# Patient Record
Sex: FEMALE | Race: WHITE | NOT HISPANIC OR LATINO | Employment: OTHER | URBAN - METROPOLITAN AREA
[De-identification: names, ages, dates, MRNs, and addresses within clinical notes are randomized per-mention and may not be internally consistent; named-entity substitution may affect disease eponyms.]

---

## 2017-01-19 RX ORDER — ATORVASTATIN CALCIUM 20 MG/1
TABLET, FILM COATED ORAL
Qty: 45 TABLET | Refills: 0 | Status: SHIPPED | OUTPATIENT
Start: 2017-01-19 | End: 2017-04-18 | Stop reason: SDUPTHER

## 2017-02-28 RX ORDER — MECLIZINE HYDROCHLORIDE 25 MG/1
TABLET ORAL
Qty: 40 TABLET | Refills: 0 | Status: SHIPPED | OUTPATIENT
Start: 2017-02-28 | End: 2017-11-14

## 2017-03-07 RX ORDER — RALOXIFENE HYDROCHLORIDE 60 MG/1
TABLET, FILM COATED ORAL
Qty: 90 TABLET | Refills: 1 | Status: SHIPPED | OUTPATIENT
Start: 2017-03-07 | End: 2017-08-26 | Stop reason: SDUPTHER

## 2017-04-18 RX ORDER — ATORVASTATIN CALCIUM 20 MG/1
TABLET, FILM COATED ORAL
Qty: 45 TABLET | Refills: 0 | Status: SHIPPED | OUTPATIENT
Start: 2017-04-18 | End: 2017-07-16 | Stop reason: SDUPTHER

## 2017-04-27 ENCOUNTER — TELEPHONE (OUTPATIENT)
Dept: INTERNAL MEDICINE | Facility: CLINIC | Age: 72
End: 2017-04-27

## 2017-04-27 RX ORDER — AMOXICILLIN AND CLAVULANATE POTASSIUM 875; 125 MG/1; MG/1
1 TABLET, FILM COATED ORAL 2 TIMES DAILY
Qty: 20 TABLET | Refills: 0 | Status: SHIPPED | OUTPATIENT
Start: 2017-04-27 | End: 2017-05-08

## 2017-04-27 NOTE — TELEPHONE ENCOUNTER
Pt c/o sinus infection  Ears hurt, sinus pressure, gums hurt, eyes are crusty in the morning  She is requesting a RX

## 2017-05-08 ENCOUNTER — OFFICE VISIT (OUTPATIENT)
Dept: INTERNAL MEDICINE | Facility: CLINIC | Age: 72
End: 2017-05-08

## 2017-05-08 VITALS
HEART RATE: 100 BPM | SYSTOLIC BLOOD PRESSURE: 140 MMHG | OXYGEN SATURATION: 92 % | HEIGHT: 64 IN | RESPIRATION RATE: 16 BRPM | BODY MASS INDEX: 31.24 KG/M2 | DIASTOLIC BLOOD PRESSURE: 90 MMHG | WEIGHT: 183 LBS | TEMPERATURE: 98.7 F

## 2017-05-08 DIAGNOSIS — J01.01 ACUTE RECURRENT MAXILLARY SINUSITIS: ICD-10-CM

## 2017-05-08 DIAGNOSIS — J32.0 CHRONIC MAXILLARY SINUSITIS: Primary | ICD-10-CM

## 2017-05-08 PROCEDURE — 99213 OFFICE O/P EST LOW 20 MIN: CPT | Performed by: NURSE PRACTITIONER

## 2017-05-08 RX ORDER — FLUTICASONE PROPIONATE 50 MCG
2 SPRAY, SUSPENSION (ML) NASAL DAILY
Qty: 1 EACH | Refills: 3 | Status: SHIPPED | OUTPATIENT
Start: 2017-05-08 | End: 2017-05-08 | Stop reason: SDUPTHER

## 2017-05-08 RX ORDER — AMOXICILLIN AND CLAVULANATE POTASSIUM 875; 125 MG/1; MG/1
TABLET, FILM COATED ORAL
Qty: 20 TABLET | Refills: 0 | OUTPATIENT
Start: 2017-05-08

## 2017-05-08 RX ORDER — FLUTICASONE PROPIONATE 50 MCG
SPRAY, SUSPENSION (ML) NASAL
Qty: 48 ML | Refills: 3 | Status: SHIPPED | OUTPATIENT
Start: 2017-05-08 | End: 2017-11-14

## 2017-05-08 RX ORDER — AMOXICILLIN AND CLAVULANATE POTASSIUM 875; 125 MG/1; MG/1
1 TABLET, FILM COATED ORAL 2 TIMES DAILY
Qty: 8 TABLET | Refills: 0 | Status: SHIPPED | OUTPATIENT
Start: 2017-05-08 | End: 2017-05-19

## 2017-05-19 ENCOUNTER — OFFICE VISIT (OUTPATIENT)
Dept: INTERNAL MEDICINE | Facility: CLINIC | Age: 72
End: 2017-05-19

## 2017-05-19 VITALS
SYSTOLIC BLOOD PRESSURE: 128 MMHG | TEMPERATURE: 98.2 F | BODY MASS INDEX: 31.41 KG/M2 | HEART RATE: 88 BPM | RESPIRATION RATE: 16 BRPM | DIASTOLIC BLOOD PRESSURE: 82 MMHG | WEIGHT: 183 LBS

## 2017-05-19 DIAGNOSIS — I10 ESSENTIAL HYPERTENSION: ICD-10-CM

## 2017-05-19 DIAGNOSIS — E78.2 MIXED HYPERLIPIDEMIA: ICD-10-CM

## 2017-05-19 DIAGNOSIS — F41.8 MIXED ANXIETY DEPRESSIVE DISORDER: ICD-10-CM

## 2017-05-19 DIAGNOSIS — H65.01 RIGHT ACUTE SEROUS OTITIS MEDIA, RECURRENCE NOT SPECIFIED: Primary | ICD-10-CM

## 2017-05-19 LAB
ALBUMIN SERPL-MCNC: 4.3 G/DL (ref 3.5–5.2)
ALBUMIN/GLOB SERPL: 1.6 G/DL
ALP SERPL-CCNC: 48 U/L (ref 39–117)
ALT SERPL-CCNC: 25 U/L (ref 1–33)
AST SERPL-CCNC: 25 U/L (ref 1–32)
BASOPHILS # BLD AUTO: 0.04 10*3/MM3 (ref 0–0.2)
BASOPHILS NFR BLD AUTO: 0.5 % (ref 0–1.5)
BILIRUB SERPL-MCNC: 0.4 MG/DL (ref 0.1–1.2)
BUN SERPL-MCNC: 12 MG/DL (ref 8–23)
BUN/CREAT SERPL: 16 (ref 7–25)
CALCIUM SERPL-MCNC: 10.6 MG/DL (ref 8.6–10.5)
CHLORIDE SERPL-SCNC: 106 MMOL/L (ref 98–107)
CHOLEST SERPL-MCNC: 182 MG/DL (ref 0–200)
CHOLEST/HDLC SERPL: 2.94 {RATIO}
CO2 SERPL-SCNC: 29.1 MMOL/L (ref 22–29)
CREAT SERPL-MCNC: 0.75 MG/DL (ref 0.57–1)
EOSINOPHIL # BLD AUTO: 0.49 10*3/MM3 (ref 0–0.7)
EOSINOPHIL NFR BLD AUTO: 5.9 % (ref 0.3–6.2)
ERYTHROCYTE [DISTWIDTH] IN BLOOD BY AUTOMATED COUNT: 12.7 % (ref 11.7–13)
GLOBULIN SER CALC-MCNC: 2.7 GM/DL
GLUCOSE SERPL-MCNC: 104 MG/DL (ref 65–99)
HCT VFR BLD AUTO: 42.8 % (ref 35.6–45.5)
HDLC SERPL-MCNC: 62 MG/DL (ref 40–60)
HGB BLD-MCNC: 14.1 G/DL (ref 11.9–15.5)
IMM GRANULOCYTES # BLD: 0.02 10*3/MM3 (ref 0–0.03)
IMM GRANULOCYTES NFR BLD: 0.2 % (ref 0–0.5)
LDLC SERPL CALC-MCNC: 83 MG/DL (ref 0–100)
LYMPHOCYTES # BLD AUTO: 2.5 10*3/MM3 (ref 0.9–4.8)
LYMPHOCYTES NFR BLD AUTO: 30.2 % (ref 19.6–45.3)
MCH RBC QN AUTO: 31.2 PG (ref 26.9–32)
MCHC RBC AUTO-ENTMCNC: 32.9 G/DL (ref 32.4–36.3)
MCV RBC AUTO: 94.7 FL (ref 80.5–98.2)
MONOCYTES # BLD AUTO: 0.8 10*3/MM3 (ref 0.2–1.2)
MONOCYTES NFR BLD AUTO: 9.7 % (ref 5–12)
NEUTROPHILS # BLD AUTO: 4.44 10*3/MM3 (ref 1.9–8.1)
NEUTROPHILS NFR BLD AUTO: 53.5 % (ref 42.7–76)
PLATELET # BLD AUTO: 180 10*3/MM3 (ref 140–500)
POTASSIUM SERPL-SCNC: 4.7 MMOL/L (ref 3.5–5.2)
PROT SERPL-MCNC: 7 G/DL (ref 6–8.5)
RBC # BLD AUTO: 4.52 10*6/MM3 (ref 3.9–5.2)
SODIUM SERPL-SCNC: 147 MMOL/L (ref 136–145)
TRIGL SERPL-MCNC: 185 MG/DL (ref 0–150)
VLDLC SERPL CALC-MCNC: 37 MG/DL (ref 5–40)
WBC # BLD AUTO: 8.29 10*3/MM3 (ref 4.5–10.7)

## 2017-05-19 PROCEDURE — 99214 OFFICE O/P EST MOD 30 MIN: CPT | Performed by: FAMILY MEDICINE

## 2017-05-19 PROCEDURE — G0439 PPPS, SUBSEQ VISIT: HCPCS | Performed by: FAMILY MEDICINE

## 2017-05-30 RX ORDER — VERAPAMIL HYDROCHLORIDE 240 MG/1
CAPSULE, EXTENDED RELEASE ORAL
Qty: 90 CAPSULE | Refills: 0 | Status: SHIPPED | OUTPATIENT
Start: 2017-05-30 | End: 2017-08-26 | Stop reason: SDUPTHER

## 2017-06-29 RX ORDER — LOSARTAN POTASSIUM AND HYDROCHLOROTHIAZIDE 12.5; 5 MG/1; MG/1
TABLET ORAL
Qty: 90 TABLET | Refills: 0 | Status: SHIPPED | OUTPATIENT
Start: 2017-06-29 | End: 2017-09-26 | Stop reason: SDUPTHER

## 2017-07-17 RX ORDER — ATORVASTATIN CALCIUM 20 MG/1
TABLET, FILM COATED ORAL
Qty: 45 TABLET | Refills: 0 | Status: SHIPPED | OUTPATIENT
Start: 2017-07-17 | End: 2017-10-12 | Stop reason: SDUPTHER

## 2017-08-28 RX ORDER — RALOXIFENE HYDROCHLORIDE 60 MG/1
TABLET, FILM COATED ORAL
Qty: 90 TABLET | Refills: 1 | Status: SHIPPED | OUTPATIENT
Start: 2017-08-28 | End: 2018-02-25 | Stop reason: SDUPTHER

## 2017-08-28 RX ORDER — VERAPAMIL HYDROCHLORIDE 240 MG/1
CAPSULE, EXTENDED RELEASE ORAL
Qty: 90 CAPSULE | Refills: 1 | Status: SHIPPED | OUTPATIENT
Start: 2017-08-28 | End: 2018-02-25 | Stop reason: SDUPTHER

## 2017-09-26 RX ORDER — LOSARTAN POTASSIUM AND HYDROCHLOROTHIAZIDE 12.5; 5 MG/1; MG/1
TABLET ORAL
Qty: 90 TABLET | Refills: 1 | Status: SHIPPED | OUTPATIENT
Start: 2017-09-26 | End: 2018-03-23 | Stop reason: SDUPTHER

## 2017-10-11 ENCOUNTER — TRANSCRIBE ORDERS (OUTPATIENT)
Dept: ADMINISTRATIVE | Facility: HOSPITAL | Age: 72
End: 2017-10-11

## 2017-10-11 DIAGNOSIS — Z12.31 SCREENING MAMMOGRAM, ENCOUNTER FOR: Primary | ICD-10-CM

## 2017-10-13 RX ORDER — ATORVASTATIN CALCIUM 20 MG/1
TABLET, FILM COATED ORAL
Qty: 45 TABLET | Refills: 0 | Status: SHIPPED | OUTPATIENT
Start: 2017-10-13 | End: 2018-01-10 | Stop reason: SDUPTHER

## 2017-11-14 ENCOUNTER — OFFICE VISIT (OUTPATIENT)
Dept: INTERNAL MEDICINE | Facility: CLINIC | Age: 72
End: 2017-11-14

## 2017-11-14 VITALS
HEART RATE: 98 BPM | TEMPERATURE: 98.3 F | BODY MASS INDEX: 31.24 KG/M2 | DIASTOLIC BLOOD PRESSURE: 86 MMHG | SYSTOLIC BLOOD PRESSURE: 146 MMHG | OXYGEN SATURATION: 96 % | WEIGHT: 182 LBS

## 2017-11-14 DIAGNOSIS — J30.1 CHRONIC ALLERGIC RHINITIS DUE TO POLLEN, UNSPECIFIED SEASONALITY: ICD-10-CM

## 2017-11-14 DIAGNOSIS — E78.2 MIXED HYPERLIPIDEMIA: ICD-10-CM

## 2017-11-14 DIAGNOSIS — E83.52 HYPERCALCEMIA: ICD-10-CM

## 2017-11-14 DIAGNOSIS — I10 ESSENTIAL HYPERTENSION: Primary | ICD-10-CM

## 2017-11-14 PROCEDURE — 99214 OFFICE O/P EST MOD 30 MIN: CPT | Performed by: FAMILY MEDICINE

## 2017-11-14 RX ORDER — MONTELUKAST SODIUM 10 MG/1
10 TABLET ORAL NIGHTLY
COMMUNITY
End: 2021-08-31

## 2017-11-14 RX ORDER — TRIAMCINOLONE ACETONIDE 55 UG/1
2 SPRAY, METERED NASAL DAILY
COMMUNITY
End: 2022-11-28

## 2017-11-14 RX ORDER — OXYMETAZOLINE HYDROCHLORIDE 0.05 G/100ML
2 SPRAY NASAL 2 TIMES DAILY
COMMUNITY
End: 2022-11-28

## 2017-11-14 NOTE — PROGRESS NOTES
Subjective   Gardenia Roper is a 72 y.o. female.     Chief Complaint   Patient presents with   • Hypertension   • Hyperlipidemia   • Allergies         History of Present Illness   Patient is here for recheck with a history of hypertension hyperlipidemia and seasonal allergies.  Her chronic sinus congestion much improved with allergy injections and treatment with Singulair from Dr. Cazares allergist.    Medications reviewed and continued for hypertension and she is recovered from previous depression that she was treated for this does not require antidepressants.  She is overall feeling much better.  There is a distant history of hyperkalemia.    The following portions of the patient's history were reviewed and updated as appropriate: allergies, current medications, past social history and problem list.    Review of Systems   Constitutional: Negative.    HENT: Negative.    Eyes: Negative.    Respiratory: Negative.    Cardiovascular: Negative.    Gastrointestinal: Negative.    Endocrine: Negative.    Genitourinary: Negative.    Musculoskeletal: Negative.    Skin: Negative.    Allergic/Immunologic: Negative.    Neurological: Negative.    Hematological: Negative.    Psychiatric/Behavioral: Negative.        Objective   Vitals:    11/14/17 1315   BP: 146/86   Pulse: 98   Temp: 98.3 °F (36.8 °C)   SpO2: 96%     Physical Exam   Constitutional: She is oriented to person, place, and time. She appears well-developed and well-nourished.   HENT:   Head: Normocephalic and atraumatic.   Right Ear: Tympanic membrane and external ear normal.   Left Ear: Tympanic membrane and external ear normal.   Nose: Nose normal.   Mouth/Throat: Oropharynx is clear and moist.   Eyes: Conjunctivae and EOM are normal. Pupils are equal, round, and reactive to light.   Neck: Normal range of motion. Neck supple. No JVD present. No thyromegaly present.   Cardiovascular: Normal rate, regular rhythm, normal heart sounds and intact distal pulses.     Pulmonary/Chest: Effort normal and breath sounds normal.   Abdominal: Soft. Bowel sounds are normal.   Musculoskeletal: Normal range of motion.   Lymphadenopathy:     She has no cervical adenopathy.   Neurological: She is alert and oriented to person, place, and time. No cranial nerve deficit. Coordination normal.   Skin: Skin is warm and dry. No rash noted.   Psychiatric: She has a normal mood and affect. Her behavior is normal. Judgment and thought content normal.   Vitals reviewed.      Assessment/Plan   Problem List Items Addressed This Visit        Cardiovascular and Mediastinum    Hypertension - Primary    Relevant Orders    CBC & Differential    Comprehensive Metabolic Panel    Lipid Panel With / Chol / HDL Ratio    Urinalysis With / Microscopic If Indicated - Urine, Clean Catch    TSH    Hyperlipidemia    Relevant Orders    CBC & Differential    Comprehensive Metabolic Panel    Lipid Panel With / Chol / HDL Ratio    Urinalysis With / Microscopic If Indicated - Urine, Clean Catch    TSH       Respiratory    Atopic rhinitis    Relevant Orders    CBC & Differential    Comprehensive Metabolic Panel    Lipid Panel With / Chol / HDL Ratio    Urinalysis With / Microscopic If Indicated - Urine, Clean Catch    TSH       Other    Hypercalcemia    Relevant Orders    CBC & Differential    Comprehensive Metabolic Panel    Lipid Panel With / Chol / HDL Ratio    Urinalysis With / Microscopic If Indicated - Urine, Clean Catch    TSH      Labs today continue current medications follow-up with allergist Medicare wellness visit in 6 months.

## 2017-11-15 LAB
ALBUMIN SERPL-MCNC: 4.6 G/DL (ref 3.5–5.2)
ALBUMIN/GLOB SERPL: 1.8 G/DL
ALP SERPL-CCNC: 50 U/L (ref 39–117)
ALT SERPL-CCNC: 25 U/L (ref 1–33)
APPEARANCE UR: CLEAR
AST SERPL-CCNC: 21 U/L (ref 1–32)
BACTERIA #/AREA URNS HPF: ABNORMAL /HPF
BASOPHILS # BLD AUTO: 0.05 10*3/MM3 (ref 0–0.2)
BASOPHILS NFR BLD AUTO: 0.5 % (ref 0–1.5)
BILIRUB SERPL-MCNC: 0.5 MG/DL (ref 0.1–1.2)
BILIRUB UR QL STRIP: NEGATIVE
BUN SERPL-MCNC: 11 MG/DL (ref 8–23)
BUN/CREAT SERPL: 12.9 (ref 7–25)
CALCIUM SERPL-MCNC: 11.6 MG/DL (ref 8.6–10.5)
CASTS URNS MICRO: ABNORMAL
CHLORIDE SERPL-SCNC: 102 MMOL/L (ref 98–107)
CHOLEST SERPL-MCNC: 200 MG/DL (ref 0–200)
CHOLEST/HDLC SERPL: 2.53 {RATIO}
CO2 SERPL-SCNC: 28.8 MMOL/L (ref 22–29)
COLOR UR: YELLOW
CREAT SERPL-MCNC: 0.85 MG/DL (ref 0.57–1)
EOSINOPHIL # BLD AUTO: 0.34 10*3/MM3 (ref 0–0.7)
EOSINOPHIL NFR BLD AUTO: 3.5 % (ref 0.3–6.2)
EPI CELLS #/AREA URNS HPF: ABNORMAL /HPF
ERYTHROCYTE [DISTWIDTH] IN BLOOD BY AUTOMATED COUNT: 12.8 % (ref 11.7–13)
GFR SERPLBLD CREATININE-BSD FMLA CKD-EPI: 65 ML/MIN/1.73
GFR SERPLBLD CREATININE-BSD FMLA CKD-EPI: 79 ML/MIN/1.73
GLOBULIN SER CALC-MCNC: 2.6 GM/DL
GLUCOSE SERPL-MCNC: 87 MG/DL (ref 65–99)
GLUCOSE UR QL: NEGATIVE
HCT VFR BLD AUTO: 47.1 % (ref 35.6–45.5)
HDLC SERPL-MCNC: 79 MG/DL (ref 40–60)
HGB BLD-MCNC: 14.9 G/DL (ref 11.9–15.5)
HGB UR QL STRIP: NEGATIVE
IMM GRANULOCYTES # BLD: 0.04 10*3/MM3 (ref 0–0.03)
IMM GRANULOCYTES NFR BLD: 0.4 % (ref 0–0.5)
KETONES UR QL STRIP: NEGATIVE
LDLC SERPL CALC-MCNC: 77 MG/DL (ref 0–100)
LEUKOCYTE ESTERASE UR QL STRIP: ABNORMAL
LYMPHOCYTES # BLD AUTO: 2.54 10*3/MM3 (ref 0.9–4.8)
LYMPHOCYTES NFR BLD AUTO: 25.9 % (ref 19.6–45.3)
MCH RBC QN AUTO: 31.2 PG (ref 26.9–32)
MCHC RBC AUTO-ENTMCNC: 31.6 G/DL (ref 32.4–36.3)
MCV RBC AUTO: 98.7 FL (ref 80.5–98.2)
MONOCYTES # BLD AUTO: 1.24 10*3/MM3 (ref 0.2–1.2)
MONOCYTES NFR BLD AUTO: 12.7 % (ref 5–12)
NEUTROPHILS # BLD AUTO: 5.58 10*3/MM3 (ref 1.9–8.1)
NEUTROPHILS NFR BLD AUTO: 57 % (ref 42.7–76)
NITRITE UR QL STRIP: NEGATIVE
PH UR STRIP: 7 [PH] (ref 5–8)
PLATELET # BLD AUTO: 199 10*3/MM3 (ref 140–500)
POTASSIUM SERPL-SCNC: 4.7 MMOL/L (ref 3.5–5.2)
PROT SERPL-MCNC: 7.2 G/DL (ref 6–8.5)
PROT UR QL STRIP: NEGATIVE
RBC # BLD AUTO: 4.77 10*6/MM3 (ref 3.9–5.2)
RBC #/AREA URNS HPF: ABNORMAL /HPF
SODIUM SERPL-SCNC: 143 MMOL/L (ref 136–145)
SP GR UR: 1.01 (ref 1–1.03)
TRIGL SERPL-MCNC: 219 MG/DL (ref 0–150)
TSH SERPL DL<=0.005 MIU/L-ACNC: 2.05 MIU/ML (ref 0.27–4.2)
UROBILINOGEN UR STRIP-MCNC: ABNORMAL MG/DL
VLDLC SERPL CALC-MCNC: 43.8 MG/DL (ref 5–40)
WBC # BLD AUTO: 9.79 10*3/MM3 (ref 4.5–10.7)
WBC #/AREA URNS HPF: ABNORMAL /HPF

## 2017-11-28 ENCOUNTER — HOSPITAL ENCOUNTER (OUTPATIENT)
Dept: MAMMOGRAPHY | Facility: HOSPITAL | Age: 72
Discharge: HOME | End: 2017-11-28
Admitting: FAMILY MEDICINE

## 2017-11-28 DIAGNOSIS — Z12.31 SCREENING MAMMOGRAM, ENCOUNTER FOR: ICD-10-CM

## 2017-11-28 PROCEDURE — 77063 BREAST TOMOSYNTHESIS BI: CPT

## 2017-11-28 PROCEDURE — G0202 SCR MAMMO BI INCL CAD: HCPCS

## 2018-01-10 RX ORDER — ATORVASTATIN CALCIUM 20 MG/1
TABLET, FILM COATED ORAL
Qty: 45 TABLET | Refills: 0 | Status: SHIPPED | OUTPATIENT
Start: 2018-01-10 | End: 2018-04-08 | Stop reason: SDUPTHER

## 2018-02-26 RX ORDER — VERAPAMIL HYDROCHLORIDE 240 MG/1
CAPSULE, EXTENDED RELEASE ORAL
Qty: 90 CAPSULE | Refills: 0 | Status: SHIPPED | OUTPATIENT
Start: 2018-02-26 | End: 2018-05-26 | Stop reason: SDUPTHER

## 2018-02-26 RX ORDER — RALOXIFENE HYDROCHLORIDE 60 MG/1
TABLET, FILM COATED ORAL
Qty: 90 TABLET | Refills: 0 | Status: SHIPPED | OUTPATIENT
Start: 2018-02-26 | End: 2018-05-27 | Stop reason: SDUPTHER

## 2018-03-23 RX ORDER — LOSARTAN POTASSIUM AND HYDROCHLOROTHIAZIDE 12.5; 5 MG/1; MG/1
TABLET ORAL
Qty: 90 TABLET | Refills: 0 | Status: SHIPPED | OUTPATIENT
Start: 2018-03-23 | End: 2018-06-21 | Stop reason: SDUPTHER

## 2018-04-09 RX ORDER — ATORVASTATIN CALCIUM 20 MG/1
TABLET, FILM COATED ORAL
Qty: 45 TABLET | Refills: 0 | Status: SHIPPED | OUTPATIENT
Start: 2018-04-09 | End: 2018-07-06 | Stop reason: SDUPTHER

## 2018-05-15 ENCOUNTER — OFFICE VISIT (OUTPATIENT)
Dept: INTERNAL MEDICINE | Facility: CLINIC | Age: 73
End: 2018-05-15

## 2018-05-15 VITALS
BODY MASS INDEX: 31.41 KG/M2 | SYSTOLIC BLOOD PRESSURE: 142 MMHG | OXYGEN SATURATION: 95 % | WEIGHT: 183 LBS | DIASTOLIC BLOOD PRESSURE: 84 MMHG | HEART RATE: 93 BPM | TEMPERATURE: 97.8 F

## 2018-05-15 DIAGNOSIS — Z00.00 MEDICARE ANNUAL WELLNESS VISIT, SUBSEQUENT: ICD-10-CM

## 2018-05-15 DIAGNOSIS — I10 ESSENTIAL HYPERTENSION: Primary | ICD-10-CM

## 2018-05-15 DIAGNOSIS — E78.2 MIXED HYPERLIPIDEMIA: ICD-10-CM

## 2018-05-15 DIAGNOSIS — F41.8 MIXED ANXIETY DEPRESSIVE DISORDER: ICD-10-CM

## 2018-05-15 PROCEDURE — G0439 PPPS, SUBSEQ VISIT: HCPCS | Performed by: FAMILY MEDICINE

## 2018-05-15 PROCEDURE — 99214 OFFICE O/P EST MOD 30 MIN: CPT | Performed by: FAMILY MEDICINE

## 2018-05-15 PROCEDURE — 96160 PT-FOCUSED HLTH RISK ASSMT: CPT | Performed by: FAMILY MEDICINE

## 2018-05-15 NOTE — PATIENT INSTRUCTIONS
Medicare Wellness  Personal Prevention Plan of Service     Date of Office Visit:  05/15/2018  Encounter Provider:  Jim Hampton Jr., MD  Place of Service:  [facility] INTERNAL MEDICINE  Patient Name: Gardenia Roper  :  1945    As part of the Medicare Wellness portion of your visit today, we are providing you with this personalized preventive plan of services (PPPS). This plan is based upon recommendations of the [place] Preventive Services Task Force (USPSTF) and the Advisory Committee on Immunization Practices (ACIP).    This lists the preventive care services that should be considered, and provides dates of when you are due. Items listed as completed are up-to-date and do not require any further intervention.    Health Maintenance   Topic Date Due   • TDAP/TD VACCINES (1 - Tdap) 1962   • ZOSTER VACCINE  2016   • COLONOSCOPY  2016   • DXA SCAN  2016   • MEDICARE ANNUAL WELLNESS  2018   • INFLUENZA VACCINE  2018   • LIPID PANEL  2018   • MAMMOGRAM  2019   • PNEUMOCOCCAL VACCINES (65+ LOW/MEDIUM RISK)  Completed       No orders of the defined types were placed in this encounter.      No Follow-up on file.

## 2018-05-15 NOTE — PROGRESS NOTES
Subjective   Gardenia Roper is a 72 y.o. female.     Chief Complaint   Patient presents with   • Annual Exam         History of Present Illness   Delightful lady here for Medicare wellness visit also rechecking hypertension hyperlipidemia history of Anxiety disorder.  She's had a history of severe allergic rhinitis which seems to be doing better this year.  She still has some seasonal allergies.  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  She never had a stroke.  She's had no cervical bleed.  Medicare wellness visit is performed.      The following portions of the patient's history were reviewed and updated as appropriate: allergies, current medications, past social history and problem list.    Review of Systems   Constitutional: Negative.    HENT: Negative.    Eyes: Negative.    Respiratory: Negative.    Cardiovascular: Negative.    Endocrine: Negative.    Genitourinary: Negative.    Musculoskeletal: Negative.    Allergic/Immunologic: Positive for environmental allergies.   All other systems reviewed and are negative.      Objective   Vitals:    05/15/18 1353   BP: 142/84   Pulse: 93   Temp: 97.8 °F (36.6 °C)   SpO2: 95%     Physical Exam   Constitutional: She is oriented to person, place, and time. She appears well-developed and well-nourished.   HENT:   Head: Normocephalic.   Right Ear: External ear normal.   Left Ear: External ear normal.   Mouth/Throat: Oropharynx is clear and moist.   Eyes: EOM are normal. Pupils are equal, round, and reactive to light.   Neck: Normal range of motion. Neck supple.   Cardiovascular: Normal rate, regular rhythm and normal heart sounds.    Pulmonary/Chest: Effort normal and breath sounds normal.   Abdominal: Soft. Bowel sounds are normal.   Musculoskeletal: Normal range of motion.   Neurological: She is alert and oriented to person, place, and time.   Skin: Skin is warm and dry.   Psychiatric: She has a normal mood and affect.   Nursing note and vitals  reviewed.      Assessment/Plan   Problem List Items Addressed This Visit        Cardiovascular and Mediastinum    Hypertension - Primary    Hyperlipidemia       Other    Mixed anxiety depressive disorder      Other Visit Diagnoses     Medicare annual wellness visit, subsequent          Plan: Medications remain the same.  We'll get labs and next visit in 6 months.

## 2018-05-15 NOTE — PROGRESS NOTES
QUICK REFERENCE INFORMATION:  The ABCs of the Annual Wellness Visit    Subsequent Medicare Wellness Visit    HEALTH RISK ASSESSMENT    1945    Recent Hospitalizations:  No hospitalization(s) within the last year..        Current Medical Providers:  Patient Care Team:  Jim Hampton Jr., MD as PCP - General (Family Medicine)  Edil Little MD as Consulting Physician (Otolaryngology)        Smoking Status:  History   Smoking Status   • Former Smoker   • Packs/day: 1.00   • Quit date: 5/6/1986   Smokeless Tobacco   • Never Used     Comment: quit 25 years ago       Alcohol Consumption:  History   Alcohol Use   • 0.6 oz/week   • 1 Glasses of wine per week     Comment: 2-3 times a week, one glass of wine, none in last 3-4 days       Depression Screen:   PHQ-2/PHQ-9 Depression Screening 5/15/2018   Little interest or pleasure in doing things 0   Feeling down, depressed, or hopeless 0   Total Score 0       Health Habits and Functional and Cognitive Screening:  Functional & Cognitive Status 5/15/2018   Do you have difficulty preparing food and eating? No   Do you have difficulty bathing yourself, getting dressed or grooming yourself? No   Do you have difficulty using the toilet? No   Do you have difficulty moving around from place to place? No   Do you have trouble with steps or getting out of a bed or a chair? No   In the past year have you fallen or experienced a near fall? No   Current Diet Well Balanced Diet   Dental Exam Up to date   Eye Exam Up to date   Exercise (times per week) 0 times per week   Current Exercise Activities Include None   Do you need help using the phone?  No   Are you deaf or do you have serious difficulty hearing?  No   Do you need help with transportation? No   Do you need help shopping? No   Do you need help preparing meals?  No   Do you need help with housework?  No   Do you need help with laundry? No   Do you need help taking your medications? No   Do you need help managing money? No    Do you ever drive or ride in a car without wearing a seat belt? No   Have you felt unusual stress, anger or loneliness in the last month? No   Who do you live with? Alone   If you need help, do you have trouble finding someone available to you? No   Have you been bothered in the last four weeks by sexual problems? No   Do you have difficulty concentrating, remembering or making decisions? No           Does the patient have evidence of cognitive impairment? No    Aspirin use counseling: Does not need ASA (and currently is not on it)      Recent Lab Results:  CMP:  Lab Results   Component Value Date    GLU 87 11/14/2017    BUN 11 11/14/2017    CREATININE 0.85 11/14/2017    EGFRIFNONA 65 11/14/2017    EGFRIFAFRI 79 11/14/2017    BCR 12.9 11/14/2017     11/14/2017    K 4.7 11/14/2017    CO2 28.8 11/14/2017    CALCIUM 11.6 (H) 11/14/2017    PROTENTOTREF 7.2 11/14/2017    ALBUMIN 4.60 11/14/2017    LABGLOBREF 2.6 11/14/2017    LABIL2 1.8 11/14/2017    BILITOT 0.5 11/14/2017    ALKPHOS 50 11/14/2017    AST 21 11/14/2017    ALT 25 11/14/2017     Lipid Panel:  Lab Results   Component Value Date    TRIG 219 (H) 11/14/2017    HDL 79 (H) 11/14/2017    VLDL 43.8 (H) 11/14/2017     HbA1c:       Visual Acuity:  No exam data present    Age-appropriate Screening Schedule:  Refer to the list below for future screening recommendations based on patient's age, sex and/or medical conditions. Orders for these recommended tests are listed in the plan section. The patient has been provided with a written plan.    Health Maintenance   Topic Date Due   • TDAP/TD VACCINES (1 - Tdap) 08/14/1962   • ZOSTER VACCINE  03/02/2016   • COLONOSCOPY  04/19/2016   • DXA SCAN  08/05/2016   • INFLUENZA VACCINE  08/01/2018   • LIPID PANEL  11/14/2018   • MAMMOGRAM  11/28/2019   • PNEUMOCOCCAL VACCINES (65+ LOW/MEDIUM RISK)  Completed        Subjective   History of Present Illness    Gardenia Roper is a 72 y.o. female who presents for an Subsequent  Wellness Visit.    The following portions of the patient's history were reviewed and updated as appropriate: allergies, current medications, past family history, past medical history, past social history, past surgical history and problem list.    Outpatient Medications Prior to Visit   Medication Sig Dispense Refill   • atorvastatin (LIPITOR) 20 MG tablet TAKE 1/2 TABLET BY MOUTH DAILY 45 tablet 0   • Cetirizine HCl 10 MG capsule Take  by mouth daily.     • Cholecalciferol (VITAMIN D-3) 1000 UNITS capsule Take 1 capsule by mouth.     • losartan-hydrochlorothiazide (HYZAAR) 50-12.5 MG per tablet TAKE 1 TABLET BY MOUTH DAILY 90 tablet 0   • Misc Natural Products (OSTEO BI-FLEX TRIPLE STRENGTH PO) Take  by mouth.     • montelukast (SINGULAIR) 10 MG tablet Take 10 mg by mouth Every Night.     • Multiple Vitamins-Minerals (CENTRUM ADULTS PO) Take 1 tablet by mouth daily.     • omeprazole (PriLOSEC) 40 MG capsule Take  by mouth daily.     • oxymetazoline (AFRIN) 0.05 % nasal spray 2 sprays into each nostril 2 (Two) Times a Day.     • raloxifene (EVISTA) 60 MG tablet TAKE 1 TABLET BY MOUTH EVERY DAY 90 tablet 0   • Triamcinolone Acetonide (NASACORT) 55 MCG/ACT nasal inhaler 2 sprays into each nostril Daily.     • verapamil (VERELAN) 240 MG 24 hr capsule TAKE 1 CAPSULE BY MOUTH EVERY NIGHT 90 capsule 0     No facility-administered medications prior to visit.        Patient Active Problem List   Diagnosis   • Right ureteral stone   • Hypertension   • Hyperlipidemia   • Hypercalcemia   • Gastroesophageal reflux disease   • Depression   • Mixed anxiety depressive disorder   • Atopic rhinitis       Advance Care Planning:  has an advance directive - a copy has been provided and is in file    Identification of Risk Factors:  Risk factors include: cardiovascular risk.    Review of Systems    Compared to one year ago, the patient feels her physical health is better.  Compared to one year ago, the patient feels her mental health is  better.    Objective     Physical Exam    Vitals:    05/15/18 1353   BP: 142/84   BP Location: Left arm   Patient Position: Sitting   Cuff Size: Adult   Pulse: 93   Temp: 97.8 °F (36.6 °C)   TempSrc: Tympanic   SpO2: 95%   Weight: 83 kg (183 lb)   PainSc: 0-No pain       Patient's Body mass index is 31.41 kg/m². BMI is above normal parameters. Recommendations include: no follow-up required.      Assessment/Plan   Patient Self-Management and Personalized Health Advice  The patient has been provided with information about: prevention of cardiac or vascular disease and preventive services including:   · Counseling for cardiovascular disease risk reduction.    Visit Diagnoses:    ICD-10-CM ICD-9-CM   1. Essential hypertension I10 401.9   2. Mixed hyperlipidemia E78.2 272.2   3. Mixed anxiety depressive disorder F41.8 300.4   4. Medicare annual wellness visit, subsequent Z00.00 V70.0       No orders of the defined types were placed in this encounter.      Outpatient Encounter Prescriptions as of 5/15/2018   Medication Sig Dispense Refill   • atorvastatin (LIPITOR) 20 MG tablet TAKE 1/2 TABLET BY MOUTH DAILY 45 tablet 0   • Cetirizine HCl 10 MG capsule Take  by mouth daily.     • Cholecalciferol (VITAMIN D-3) 1000 UNITS capsule Take 1 capsule by mouth.     • losartan-hydrochlorothiazide (HYZAAR) 50-12.5 MG per tablet TAKE 1 TABLET BY MOUTH DAILY 90 tablet 0   • Misc Natural Products (OSTEO BI-FLEX TRIPLE STRENGTH PO) Take  by mouth.     • montelukast (SINGULAIR) 10 MG tablet Take 10 mg by mouth Every Night.     • Multiple Vitamins-Minerals (CENTRUM ADULTS PO) Take 1 tablet by mouth daily.     • omeprazole (PriLOSEC) 40 MG capsule Take  by mouth daily.     • oxymetazoline (AFRIN) 0.05 % nasal spray 2 sprays into each nostril 2 (Two) Times a Day.     • raloxifene (EVISTA) 60 MG tablet TAKE 1 TABLET BY MOUTH EVERY DAY 90 tablet 0   • Triamcinolone Acetonide (NASACORT) 55 MCG/ACT nasal inhaler 2 sprays into each nostril  Daily.     • verapamil (VERELAN) 240 MG 24 hr capsule TAKE 1 CAPSULE BY MOUTH EVERY NIGHT 90 capsule 0     No facility-administered encounter medications on file as of 5/15/2018.        Reviewed use of high risk medication in the elderly: not applicable  Reviewed for potential of harmful drug interactions in the elderly: not applicable    Follow Up:  No Follow-up on file.     An After Visit Summary and PPPS with all of these plans were given to the patient.

## 2018-05-29 RX ORDER — RALOXIFENE HYDROCHLORIDE 60 MG/1
TABLET, FILM COATED ORAL
Qty: 90 TABLET | Refills: 0 | Status: SHIPPED | OUTPATIENT
Start: 2018-05-29 | End: 2018-08-26 | Stop reason: SDUPTHER

## 2018-05-29 RX ORDER — VERAPAMIL HYDROCHLORIDE 240 MG/1
CAPSULE, EXTENDED RELEASE ORAL
Qty: 90 CAPSULE | Refills: 0 | Status: SHIPPED | OUTPATIENT
Start: 2018-05-29 | End: 2018-09-01 | Stop reason: SDUPTHER

## 2018-06-21 RX ORDER — LOSARTAN POTASSIUM AND HYDROCHLOROTHIAZIDE 12.5; 5 MG/1; MG/1
TABLET ORAL
Qty: 90 TABLET | Refills: 0 | Status: SHIPPED | OUTPATIENT
Start: 2018-06-21 | End: 2018-09-17 | Stop reason: SDUPTHER

## 2018-07-06 RX ORDER — ATORVASTATIN CALCIUM 20 MG/1
TABLET, FILM COATED ORAL
Qty: 45 TABLET | Refills: 1 | Status: SHIPPED | OUTPATIENT
Start: 2018-07-06 | End: 2018-12-30 | Stop reason: SDUPTHER

## 2018-08-27 RX ORDER — RALOXIFENE HYDROCHLORIDE 60 MG/1
TABLET, FILM COATED ORAL
Qty: 90 TABLET | Refills: 0 | Status: SHIPPED | OUTPATIENT
Start: 2018-08-27 | End: 2018-11-25 | Stop reason: SDUPTHER

## 2018-09-04 RX ORDER — VERAPAMIL HYDROCHLORIDE 240 MG/1
CAPSULE, EXTENDED RELEASE ORAL
Qty: 90 CAPSULE | Refills: 0 | Status: SHIPPED | OUTPATIENT
Start: 2018-09-04 | End: 2018-11-28 | Stop reason: SDUPTHER

## 2018-09-17 RX ORDER — LOSARTAN POTASSIUM AND HYDROCHLOROTHIAZIDE 12.5; 5 MG/1; MG/1
TABLET ORAL
Qty: 90 TABLET | Refills: 0 | Status: SHIPPED | OUTPATIENT
Start: 2018-09-17 | End: 2018-12-16 | Stop reason: SDUPTHER

## 2018-10-12 ENCOUNTER — TRANSCRIBE ORDERS (OUTPATIENT)
Dept: ADMINISTRATIVE | Facility: HOSPITAL | Age: 73
End: 2018-10-12

## 2018-10-12 DIAGNOSIS — Z12.39 SCREENING BREAST EXAMINATION: Primary | ICD-10-CM

## 2018-11-26 RX ORDER — RALOXIFENE HYDROCHLORIDE 60 MG/1
TABLET, FILM COATED ORAL
Qty: 90 TABLET | Refills: 0 | Status: SHIPPED | OUTPATIENT
Start: 2018-11-26 | End: 2019-02-23 | Stop reason: SDUPTHER

## 2018-11-29 RX ORDER — VERAPAMIL HYDROCHLORIDE 240 MG/1
CAPSULE, EXTENDED RELEASE ORAL
Qty: 90 CAPSULE | Refills: 1 | Status: SHIPPED | OUTPATIENT
Start: 2018-11-29 | End: 2019-05-23 | Stop reason: SDUPTHER

## 2018-11-30 ENCOUNTER — HOSPITAL ENCOUNTER (OUTPATIENT)
Dept: MAMMOGRAPHY | Facility: HOSPITAL | Age: 73
Discharge: HOME | End: 2018-11-30
Admitting: FAMILY MEDICINE

## 2018-11-30 DIAGNOSIS — Z12.39 SCREENING BREAST EXAMINATION: ICD-10-CM

## 2018-11-30 PROCEDURE — 77063 BREAST TOMOSYNTHESIS BI: CPT

## 2018-11-30 PROCEDURE — 77067 SCR MAMMO BI INCL CAD: CPT

## 2018-12-17 RX ORDER — LOSARTAN POTASSIUM AND HYDROCHLOROTHIAZIDE 12.5; 5 MG/1; MG/1
TABLET ORAL
Qty: 90 TABLET | Refills: 1 | Status: SHIPPED | OUTPATIENT
Start: 2018-12-17 | End: 2019-01-28 | Stop reason: RX

## 2018-12-31 RX ORDER — ATORVASTATIN CALCIUM 20 MG/1
TABLET, FILM COATED ORAL
Qty: 45 TABLET | Refills: 1 | Status: SHIPPED | OUTPATIENT
Start: 2018-12-31 | End: 2019-06-30 | Stop reason: SDUPTHER

## 2019-01-28 ENCOUNTER — TELEPHONE (OUTPATIENT)
Dept: INTERNAL MEDICINE | Facility: CLINIC | Age: 74
End: 2019-01-28

## 2019-01-28 RX ORDER — TELMISARTAN AND HYDROCHLORTHIAZIDE 40; 12.5 MG/1; MG/1
1 TABLET ORAL DAILY
Qty: 30 TABLET | Refills: 5 | Status: SHIPPED | OUTPATIENT
Start: 2019-01-28 | End: 2019-02-12

## 2019-02-12 ENCOUNTER — OFFICE VISIT (OUTPATIENT)
Dept: INTERNAL MEDICINE | Facility: CLINIC | Age: 74
End: 2019-02-12

## 2019-02-12 VITALS
DIASTOLIC BLOOD PRESSURE: 82 MMHG | OXYGEN SATURATION: 91 % | BODY MASS INDEX: 30.73 KG/M2 | HEART RATE: 101 BPM | SYSTOLIC BLOOD PRESSURE: 152 MMHG | WEIGHT: 179 LBS | TEMPERATURE: 97.7 F

## 2019-02-12 DIAGNOSIS — I10 ESSENTIAL HYPERTENSION: Primary | ICD-10-CM

## 2019-02-12 DIAGNOSIS — E78.2 MIXED HYPERLIPIDEMIA: ICD-10-CM

## 2019-02-12 DIAGNOSIS — H65.00 ACUTE SEROUS OTITIS MEDIA, RECURRENCE NOT SPECIFIED, UNSPECIFIED LATERALITY: ICD-10-CM

## 2019-02-12 PROCEDURE — 99213 OFFICE O/P EST LOW 20 MIN: CPT | Performed by: FAMILY MEDICINE

## 2019-02-12 RX ORDER — AZITHROMYCIN 250 MG/1
TABLET, FILM COATED ORAL
Qty: 6 TABLET | Refills: 0 | Status: SHIPPED | OUTPATIENT
Start: 2019-02-12 | End: 2019-09-10

## 2019-02-12 RX ORDER — IRBESARTAN AND HYDROCHLOROTHIAZIDE 150; 12.5 MG/1; MG/1
1 TABLET, FILM COATED ORAL DAILY
Qty: 90 TABLET | Refills: 1 | Status: SHIPPED | OUTPATIENT
Start: 2019-02-12 | End: 2019-10-18 | Stop reason: ALTCHOICE

## 2019-02-12 NOTE — PROGRESS NOTES
Subjective   Gardenia Roper is a 73 y.o. female.     Chief Complaint   Patient presents with   • Hypertension   • Hyperlipidemia   Ear pain      History of Present Illness   Patient is here for follow-up of hypertension hyperlipidemia.  Really get screening labs today.  Her blood pressure medicine has been replaced based on a recall the original medicine low Josie.  Her other medicine that replaced this is too expensive.  We'll give her irbesartan hydrochlorothiazide 150/12.5 and see if this is better.    Otherwise recheck evidence of hyperlipidemia.    Labs will be pending today.    She is developed sinus along with the weather changes in the left ear hurts.  She has evidence of bilateral serous otitis worse in the left will give her Z-Jacinto for that.      The following portions of the patient's history were reviewed and updated as appropriate: allergies, current medications, past social history and problem list.    Review of Systems   Constitutional: Negative.    HENT: Positive for ear pain and sinus pressure.    Eyes: Negative.    Respiratory: Negative.    Cardiovascular: Negative.    Gastrointestinal: Negative.    Endocrine: Negative.    Genitourinary: Negative.    Musculoskeletal: Negative.    Skin: Negative.    Allergic/Immunologic: Negative.    Neurological: Negative.    Hematological: Negative.    Psychiatric/Behavioral: Negative.        Objective   Vitals:    02/12/19 1503   BP: 152/82   Pulse: 101   Temp: 97.7 °F (36.5 °C)   SpO2: 91%     Physical Exam   Constitutional: She is oriented to person, place, and time. She appears well-developed and well-nourished.   HENT:   Head: Normocephalic and atraumatic.   Right Ear: External ear normal. Tympanic membrane is bulging.   Left Ear: External ear normal. Tympanic membrane is bulging.   Nose: Nose normal.   Mouth/Throat: Oropharynx is clear and moist.   Eyes: Conjunctivae and EOM are normal. Pupils are equal, round, and reactive to light.   Neck: Normal range of  motion. Neck supple. No JVD present. No thyromegaly present.   Cardiovascular: Normal rate, regular rhythm, normal heart sounds and intact distal pulses.   Pulmonary/Chest: Effort normal and breath sounds normal.   Abdominal: Soft. Bowel sounds are normal.   Musculoskeletal: Normal range of motion.   Lymphadenopathy:     She has no cervical adenopathy.   Neurological: She is alert and oriented to person, place, and time. No cranial nerve deficit. Coordination normal.   Skin: Skin is warm and dry. No rash noted.   Psychiatric: She has a normal mood and affect. Her behavior is normal. Judgment and thought content normal.   Vitals reviewed.      Assessment/Plan   Problem List Items Addressed This Visit        Cardiovascular and Mediastinum    Hypertension - Primary    Relevant Medications    irbesartan-hydrochlorothiazide (AVALIDE) 150-12.5 MG tablet    Other Relevant Orders    CBC & Differential    Comprehensive Metabolic Panel    Lipid Panel With / Chol / HDL Ratio    Urinalysis With Microscopic If Indicated (No Culture) - Urine, Clean Catch    TSH    T4, Free    T3, Free    Hyperlipidemia    Relevant Orders    CBC & Differential    Comprehensive Metabolic Panel    Lipid Panel With / Chol / HDL Ratio    Urinalysis With Microscopic If Indicated (No Culture) - Urine, Clean Catch    TSH    T4, Free    T3, Free      Other Visit Diagnoses     Acute serous otitis media, recurrence not specified, unspecified laterality          Z-Jacinto for serous otitis in place blood pressure medicine with irbesartan hydrochlorothiazide 150/0.5.  Recheck in 6 months.  Medicare wellness on next visit.

## 2019-02-13 LAB
ALBUMIN SERPL-MCNC: 4.7 G/DL (ref 3.5–4.8)
ALBUMIN/GLOB SERPL: 1.9 {RATIO} (ref 1.2–2.2)
ALP SERPL-CCNC: 48 IU/L (ref 39–117)
ALT SERPL-CCNC: 27 IU/L (ref 0–32)
APPEARANCE UR: ABNORMAL
AST SERPL-CCNC: 25 IU/L (ref 0–40)
BACTERIA #/AREA URNS HPF: ABNORMAL /[HPF]
BASOPHILS # BLD AUTO: 0 X10E3/UL (ref 0–0.2)
BASOPHILS NFR BLD AUTO: 0 %
BILIRUB SERPL-MCNC: 0.9 MG/DL (ref 0–1.2)
BILIRUB UR QL STRIP: NEGATIVE
BUN SERPL-MCNC: 10 MG/DL (ref 8–27)
BUN/CREAT SERPL: 13 (ref 12–28)
CALCIUM SERPL-MCNC: 10.9 MG/DL (ref 8.7–10.3)
CHLORIDE SERPL-SCNC: 103 MMOL/L (ref 96–106)
CHOLEST SERPL-MCNC: 184 MG/DL (ref 100–199)
CHOLEST/HDLC SERPL: 2.6 RATIO (ref 0–4.4)
CO2 SERPL-SCNC: 24 MMOL/L (ref 20–29)
COLOR UR: YELLOW
CREAT SERPL-MCNC: 0.78 MG/DL (ref 0.57–1)
CRYSTALS URNS MICRO: ABNORMAL
EOSINOPHIL # BLD AUTO: 0.3 X10E3/UL (ref 0–0.4)
EOSINOPHIL NFR BLD AUTO: 2 %
EPI CELLS #/AREA URNS HPF: ABNORMAL /HPF
ERYTHROCYTE [DISTWIDTH] IN BLOOD BY AUTOMATED COUNT: 12.9 % (ref 12.3–15.4)
GLOBULIN SER CALC-MCNC: 2.5 G/DL (ref 1.5–4.5)
GLUCOSE SERPL-MCNC: 86 MG/DL (ref 65–99)
GLUCOSE UR QL: NEGATIVE
HCT VFR BLD AUTO: 44 % (ref 34–46.6)
HDLC SERPL-MCNC: 71 MG/DL
HGB BLD-MCNC: 14.8 G/DL (ref 11.1–15.9)
HGB UR QL STRIP: NEGATIVE
IMM GRANULOCYTES # BLD AUTO: 0 X10E3/UL (ref 0–0.1)
IMM GRANULOCYTES NFR BLD AUTO: 0 %
KETONES UR QL STRIP: NEGATIVE
LDLC SERPL CALC-MCNC: 78 MG/DL (ref 0–99)
LEUKOCYTE ESTERASE UR QL STRIP: ABNORMAL
LYMPHOCYTES # BLD AUTO: 3.4 X10E3/UL (ref 0.7–3.1)
LYMPHOCYTES NFR BLD AUTO: 31 %
MCH RBC QN AUTO: 31 PG (ref 26.6–33)
MCHC RBC AUTO-ENTMCNC: 33.6 G/DL (ref 31.5–35.7)
MCV RBC AUTO: 92 FL (ref 79–97)
MICRO URNS: ABNORMAL
MONOCYTES # BLD AUTO: 0.9 X10E3/UL (ref 0.1–0.9)
MONOCYTES NFR BLD AUTO: 8 %
MUCOUS THREADS URNS QL MICRO: PRESENT
NEUTROPHILS # BLD AUTO: 6.4 X10E3/UL (ref 1.4–7)
NEUTROPHILS NFR BLD AUTO: 59 %
NITRITE UR QL STRIP: NEGATIVE
PH UR STRIP: 7 [PH] (ref 5–7.5)
PLATELET # BLD AUTO: 219 X10E3/UL (ref 150–379)
POTASSIUM SERPL-SCNC: 3.9 MMOL/L (ref 3.5–5.2)
PROT SERPL-MCNC: 7.2 G/DL (ref 6–8.5)
PROT UR QL STRIP: NEGATIVE
RBC # BLD AUTO: 4.77 X10E6/UL (ref 3.77–5.28)
RBC #/AREA URNS HPF: ABNORMAL /HPF
SODIUM SERPL-SCNC: 143 MMOL/L (ref 134–144)
SP GR UR: 1.01 (ref 1–1.03)
T3FREE SERPL-MCNC: 2.8 PG/ML (ref 2–4.4)
T4 FREE SERPL-MCNC: 1.35 NG/DL (ref 0.82–1.77)
TRIGL SERPL-MCNC: 173 MG/DL (ref 0–149)
TSH SERPL DL<=0.005 MIU/L-ACNC: 1.73 UIU/ML (ref 0.45–4.5)
UNIDENT CRYS URNS QL MICRO: PRESENT
UROBILINOGEN UR STRIP-MCNC: 0.2 MG/DL (ref 0.2–1)
VLDLC SERPL CALC-MCNC: 35 MG/DL (ref 5–40)
WBC # BLD AUTO: 11 X10E3/UL (ref 3.4–10.8)
WBC #/AREA URNS HPF: ABNORMAL /HPF

## 2019-02-25 RX ORDER — RALOXIFENE HYDROCHLORIDE 60 MG/1
TABLET, FILM COATED ORAL
Qty: 90 TABLET | Refills: 1 | Status: SHIPPED | OUTPATIENT
Start: 2019-02-25 | End: 2019-08-23 | Stop reason: SDUPTHER

## 2019-05-23 RX ORDER — VERAPAMIL HYDROCHLORIDE 240 MG/1
CAPSULE, EXTENDED RELEASE ORAL
Qty: 90 CAPSULE | Refills: 1 | Status: SHIPPED | OUTPATIENT
Start: 2019-05-23 | End: 2019-11-21 | Stop reason: SDUPTHER

## 2019-07-01 RX ORDER — ATORVASTATIN CALCIUM 20 MG/1
TABLET, FILM COATED ORAL
Qty: 45 TABLET | Refills: 0 | Status: SHIPPED | OUTPATIENT
Start: 2019-07-01 | End: 2019-09-26 | Stop reason: SDUPTHER

## 2019-08-23 RX ORDER — RALOXIFENE HYDROCHLORIDE 60 MG/1
TABLET, FILM COATED ORAL
Qty: 90 TABLET | Refills: 0 | Status: SHIPPED | OUTPATIENT
Start: 2019-08-23 | End: 2019-11-21 | Stop reason: SDUPTHER

## 2019-09-10 ENCOUNTER — OFFICE VISIT (OUTPATIENT)
Dept: INTERNAL MEDICINE | Facility: CLINIC | Age: 74
End: 2019-09-10

## 2019-09-10 VITALS
SYSTOLIC BLOOD PRESSURE: 128 MMHG | HEART RATE: 93 BPM | OXYGEN SATURATION: 98 % | WEIGHT: 181 LBS | BODY MASS INDEX: 31.07 KG/M2 | DIASTOLIC BLOOD PRESSURE: 72 MMHG | TEMPERATURE: 98.4 F

## 2019-09-10 DIAGNOSIS — Z00.00 MEDICARE ANNUAL WELLNESS VISIT, SUBSEQUENT: Primary | ICD-10-CM

## 2019-09-10 DIAGNOSIS — J30.1 ALLERGIC RHINITIS DUE TO POLLEN, UNSPECIFIED SEASONALITY: ICD-10-CM

## 2019-09-10 DIAGNOSIS — E55.9 VITAMIN D DEFICIENCY: ICD-10-CM

## 2019-09-10 DIAGNOSIS — E78.2 MIXED HYPERLIPIDEMIA: ICD-10-CM

## 2019-09-10 DIAGNOSIS — E83.52 HYPERCALCEMIA: ICD-10-CM

## 2019-09-10 DIAGNOSIS — K21.9 GASTROESOPHAGEAL REFLUX DISEASE WITHOUT ESOPHAGITIS: ICD-10-CM

## 2019-09-10 DIAGNOSIS — I10 ESSENTIAL HYPERTENSION: ICD-10-CM

## 2019-09-10 PROCEDURE — 96160 PT-FOCUSED HLTH RISK ASSMT: CPT | Performed by: NURSE PRACTITIONER

## 2019-09-10 PROCEDURE — G0439 PPPS, SUBSEQ VISIT: HCPCS | Performed by: NURSE PRACTITIONER

## 2019-09-10 RX ORDER — LEVOCETIRIZINE DIHYDROCHLORIDE 5 MG/1
5 TABLET, FILM COATED ORAL EVERY EVENING
COMMUNITY
End: 2021-08-31

## 2019-09-10 NOTE — PATIENT INSTRUCTIONS
Shingles vaccine at [facility]     Flu vaccine at [facility]     For living will information, please go to this website:     [email or web address]    Medicare Wellness  Personal Prevention Plan of Service     Date of Office Visit:  09/10/2019  Encounter Provider:  Myke Sanchez III, NP-C  Place of Service:  [facility] INTERNAL MEDICINE  Patient Name: Gardenia Roper  :  1945    As part of the Medicare Wellness portion of your visit today, we are providing you with this personalized preventive plan of services (PPPS). This plan is based upon recommendations of the [place] Preventive Services Task Force (USPSTF) and the Advisory Committee on Immunization Practices (ACIP).    This lists the preventive care services that should be considered, and provides dates of when you are due. Items listed as completed are up-to-date and do not require any further intervention.    Health Maintenance   Topic Date Due   • TDAP/TD VACCINES (1 - Tdap) 1962   • ZOSTER VACCINE (1 of 2) 1993   • DXA SCAN  2016   • INFLUENZA VACCINE  2019   • COLONOSCOPY  2020 (Originally 3/2/2016)   • LIPID PANEL  2020   • MEDICARE ANNUAL WELLNESS  09/10/2020   • MAMMOGRAM  2020   • PNEUMOCOCCAL VACCINES (65+ LOW/MEDIUM RISK)  Completed       Orders Placed This Encounter   Procedures   • Vitamin D 1,25 Dihydroxy   • Calcium, Ionized       Return in about 6 months (around 3/10/2020).

## 2019-09-10 NOTE — PROGRESS NOTES
The ABCs of the Annual Wellness Visit  Subsequent Medicare Wellness Visit    CC: AWV    Subjective   History of Present Illness:  Gardenia Roper is a 74 y.o. female who presents for a Subsequent Medicare Wellness Visit.    HEALTH RISK ASSESSMENT    Recent Hospitalizations:  No hospitalization(s) within the last year.    Current Medical Providers:  Patient Care Team:  Jim Hampton Jr., MD as PCP - General (Family Medicine)  Edil Little MD as Consulting Physician (Otolaryngology)  Asif Cazares MD as Consulting Physician (Allergy and Immunology)  Sonny Mejias MD as Consulting Physician (Ophthalmology)    Smoking Status:  Social History     Tobacco Use   Smoking Status Former Smoker   • Packs/day: 1.00   • Last attempt to quit: 1986   • Years since quittin.3   Smokeless Tobacco Never Used   Tobacco Comment    quit 25 years ago       Alcohol Consumption:  Social History     Substance and Sexual Activity   Alcohol Use Yes   • Alcohol/week: 0.6 oz   • Types: 1 Glasses of wine per week    Comment: 2-3 times a week, one glass of wine, none in last 3-4 days       Depression Screen:   PHQ-2/PHQ-9 Depression Screening 9/10/2019   Little interest or pleasure in doing things 0   Feeling down, depressed, or hopeless 0   Trouble falling or staying asleep, or sleeping too much -   Feeling tired or having little energy -   Poor appetite or overeating -   Feeling bad about yourself - or that you are a failure or have let yourself or your family down -   Trouble concentrating on things, such as reading the newspaper or watching television -   Moving or speaking so slowly that other people could have noticed. Or the opposite - being so fidgety or restless that you have been moving around a lot more than usual -   Thoughts that you would be better off dead, or of hurting yourself in some way -   Total Score 0   If you checked off any problems, how difficult have these problems made it for you to do your work,  take care of things at home, or get along with other people? -       Fall Risk Screen:  ELSY Fall Risk Assessment was completed, and patient is at LOW risk for falls.Assessment completed on:9/10/2019    Health Habits and Functional and Cognitive Screening:  Functional & Cognitive Status 9/10/2019   Do you have difficulty preparing food and eating? No   Do you have difficulty bathing yourself, getting dressed or grooming yourself? No   Do you have difficulty using the toilet? No   Do you have difficulty moving around from place to place? No   Do you have trouble with steps or getting out of a bed or a chair? No   Current Diet Well Balanced Diet   Dental Exam Up to date   Eye Exam Up to date   Exercise (times per week) 0 times per week   Current Exercise Activities Include None   Do you need help using the phone?  No   Are you deaf or do you have serious difficulty hearing?  No   Do you need help with transportation? No   Do you need help shopping? No   Do you need help preparing meals?  No   Do you need help with housework?  No   Do you need help with laundry? No   Do you need help taking your medications? No   Do you need help managing money? No   Do you ever drive or ride in a car without wearing a seat belt? No   Have you felt unusual stress, anger or loneliness in the last month? No   Who do you live with? Alone   If you need help, do you have trouble finding someone available to you? No   Have you been bothered in the last four weeks by sexual problems? No   Do you have difficulty concentrating, remembering or making decisions? No         Does the patient have evidence of cognitive impairment? No    Asprin use counseling:Does not need ASA (and currently is not on it)    Age-appropriate Screening Schedule:  Refer to the list below for future screening recommendations based on patient's age, sex and/or medical conditions. Orders for these recommended tests are listed in the plan section. The patient has been  provided with a written plan.    Health Maintenance   Topic Date Due   • TDAP/TD VACCINES (1 - Tdap) 08/14/1962   • ZOSTER VACCINE (1 of 2) 08/14/1993   • DXA SCAN  08/05/2016   • INFLUENZA VACCINE  08/01/2019   • COLONOSCOPY  02/12/2020 (Originally 3/2/2016)   • LIPID PANEL  02/12/2020   • MAMMOGRAM  11/30/2020   • PNEUMOCOCCAL VACCINES (65+ LOW/MEDIUM RISK)  Completed          The following portions of the patient's history were reviewed and updated as appropriate: allergies, current medications, past family history, past medical history, past social history, past surgical history and problem list.    Outpatient Medications Prior to Visit   Medication Sig Dispense Refill   • atorvastatin (LIPITOR) 20 MG tablet TAKE 1/2 TABLET BY MOUTH DAILY 45 tablet 0   • Cetirizine HCl 10 MG capsule Take  by mouth daily.     • Cholecalciferol (VITAMIN D-3) 1000 UNITS capsule Take 1 capsule by mouth.     • irbesartan-hydrochlorothiazide (AVALIDE) 150-12.5 MG tablet Take 1 tablet by mouth Daily. 90 tablet 1   • levocetirizine (XYZAL) 5 MG tablet Take 5 mg by mouth Every Evening.     • Misc Natural Products (OSTEO BI-FLEX TRIPLE STRENGTH PO) Take  by mouth.     • montelukast (SINGULAIR) 10 MG tablet Take 10 mg by mouth Every Night.     • Multiple Vitamins-Minerals (CENTRUM ADULTS PO) Take 1 tablet by mouth daily.     • omeprazole (PriLOSEC) 40 MG capsule Take  by mouth daily.     • oxymetazoline (AFRIN) 0.05 % nasal spray 2 sprays into each nostril 2 (Two) Times a Day.     • raloxifene (EVISTA) 60 MG tablet TAKE 1 TABLET BY MOUTH EVERY DAY 90 tablet 0   • Triamcinolone Acetonide (NASACORT) 55 MCG/ACT nasal inhaler 2 sprays into each nostril Daily.     • verapamil (VERELAN) 240 MG 24 hr capsule TAKE 1 CAPSULE BY MOUTH EVERY NIGHT 90 capsule 1   • azithromycin (ZITHROMAX Z-LITA) 250 MG tablet Take 2 tablets the first day, then 1 tablet daily for 4 days. 6 tablet 0     No facility-administered medications prior to visit.        Patient  Active Problem List   Diagnosis   • Right ureteral stone   • Hypertension   • Hyperlipidemia   • Hypercalcemia   • Gastroesophageal reflux disease   • Atopic rhinitis   • Medicare annual wellness visit, subsequent   • Vitamin D deficiency       Advanced Care Planning:  Patient does not have an advance directive - additional information requested. Referral to advance care planning placed    Review of Systems   Constitutional: Negative for fatigue and unexpected weight change.   HENT: Positive for congestion and sinus pressure.    Respiratory: Negative for shortness of breath.    Cardiovascular: Negative for chest pain and leg swelling.   Gastrointestinal: Negative.    Musculoskeletal: Negative.    Neurological: Negative for headaches.   Hematological: Negative.        Compared to one year ago, the patient feels her physical health is the same.  Compared to one year ago, the patient feels her mental health is the same.    Reviewed chart for potential of high risk medication in the elderly: yes  Reviewed chart for potential of harmful drug interactions in the elderly:yes    Objective         Vitals:    09/10/19 1241   BP: 128/72   BP Location: Left arm   Patient Position: Sitting   Cuff Size: Adult   Pulse: 93   Temp: 98.4 °F (36.9 °C)   TempSrc: Tympanic   SpO2: 98%   Weight: 82.1 kg (181 lb)   PainSc: 0-No pain       Body mass index is 31.07 kg/m².  Discussed the patient's BMI with her. The BMI is in the acceptable range.    Physical Exam   Constitutional: She is oriented to person, place, and time. She appears well-developed. She is cooperative.   Eyes: Pupils are equal, round, and reactive to light.   Glasses   Neck: Neck supple. No thyromegaly present.   Cardiovascular: Normal rate, regular rhythm, normal heart sounds, intact distal pulses and normal pulses.   Pulmonary/Chest: Effort normal and breath sounds normal. She exhibits no deformity.   Equal, Unlabored   Abdominal: Soft. Bowel sounds are normal.    Neurological: She is alert and oriented to person, place, and time.   Skin: Skin is warm and dry. Capillary refill takes 2 to 3 seconds.   Psychiatric: She has a normal mood and affect.   Vitals reviewed.            Assessment/Plan   Medicare Risks and Personalized Health Plan  CMS Preventative Services Quick Reference  Advance Directive Discussion  Immunizations Discussed/Encouraged (specific immunizations; Influenza and Shingrix )    The above risks/problems have been discussed with the patient.  Pertinent information has been shared with the patient in the After Visit Summary.  Follow up plans and orders are seen below in the Assessment/Plan Section.    Diagnoses and all orders for this visit:    1. Medicare annual wellness visit, subsequent (Primary)    2. Essential hypertension    3. Mixed hyperlipidemia    4. Allergic rhinitis due to pollen, unspecified seasonality  Assessment & Plan:  Continues allergy injections twice a week      5. Gastroesophageal reflux disease without esophagitis    6. Vitamin D deficiency  -     Vitamin D 1,25 Dihydroxy  -     Calcium, Ionized    7. Hypercalcemia  -     Calcium, Ionized    Follow Up:  Return in about 6 months (around 3/10/2020).     An After Visit Summary and PPPS were given to the patient.       Signed Disabled parking license

## 2019-09-11 DIAGNOSIS — E83.52 HYPERCALCEMIA: Primary | ICD-10-CM

## 2019-09-11 LAB
1,25(OH)2D3 SERPL-MCNC: 61.8 PG/ML (ref 19.9–79.3)
CA-I SERPL ISE-MCNC: 6.4 MG/DL (ref 4.5–5.6)

## 2019-09-11 NOTE — PROGRESS NOTES
Notify patient her calcium is still elevated.  Have her to return next week for lab - PTH intact (I placed the order)   She reported she is taking vitamin D3 1000 IU daily.  If this is correct, have her to change and take this every other day.     SHANNENN

## 2019-09-16 ENCOUNTER — RESULTS ENCOUNTER (OUTPATIENT)
Dept: INTERNAL MEDICINE | Facility: CLINIC | Age: 74
End: 2019-09-16

## 2019-09-16 DIAGNOSIS — E83.52 HYPERCALCEMIA: ICD-10-CM

## 2019-09-20 LAB
CALCIUM SERPL-MCNC: 11.1 MG/DL (ref 8.7–10.3)
INTACT PTH: ABNORMAL
PTH-INTACT SERPL-MCNC: 44 PG/ML (ref 15–65)

## 2019-09-26 RX ORDER — ATORVASTATIN CALCIUM 20 MG/1
TABLET, FILM COATED ORAL
Qty: 45 TABLET | Refills: 1 | Status: SHIPPED | OUTPATIENT
Start: 2019-09-26 | End: 2020-03-26

## 2019-10-16 ENCOUNTER — TELEPHONE (OUTPATIENT)
Dept: INTERNAL MEDICINE | Facility: CLINIC | Age: 74
End: 2019-10-16

## 2019-10-16 NOTE — TELEPHONE ENCOUNTER
Pt called to see if you would change her from Irbesartan (over $200) to Valsartan-she checked with her insurance company and it would be more cost effective for her  Please advise on what to send if appropriate

## 2019-10-18 RX ORDER — VALSARTAN AND HYDROCHLOROTHIAZIDE 80; 12.5 MG/1; MG/1
1 TABLET, FILM COATED ORAL DAILY
Qty: 90 TABLET | Refills: 3 | Status: SHIPPED | OUTPATIENT
Start: 2019-10-18 | End: 2020-10-16 | Stop reason: SDUPTHER

## 2019-10-21 ENCOUNTER — TRANSCRIBE ORDERS (OUTPATIENT)
Dept: ADMINISTRATIVE | Facility: HOSPITAL | Age: 74
End: 2019-10-21

## 2019-10-21 DIAGNOSIS — Z12.31 VISIT FOR SCREENING MAMMOGRAM: Primary | ICD-10-CM

## 2019-11-21 RX ORDER — VERAPAMIL HYDROCHLORIDE 240 MG/1
CAPSULE, EXTENDED RELEASE ORAL
Qty: 90 CAPSULE | Refills: 1 | Status: SHIPPED | OUTPATIENT
Start: 2019-11-21 | End: 2020-05-22

## 2019-11-21 RX ORDER — RALOXIFENE HYDROCHLORIDE 60 MG/1
TABLET, FILM COATED ORAL
Qty: 90 TABLET | Refills: 1 | Status: SHIPPED | OUTPATIENT
Start: 2019-11-21 | End: 2020-05-18

## 2019-12-03 ENCOUNTER — HOSPITAL ENCOUNTER (OUTPATIENT)
Dept: MAMMOGRAPHY | Facility: HOSPITAL | Age: 74
Discharge: HOME | End: 2019-12-03
Admitting: FAMILY MEDICINE

## 2019-12-03 DIAGNOSIS — Z12.31 VISIT FOR SCREENING MAMMOGRAM: ICD-10-CM

## 2019-12-03 PROCEDURE — 77063 BREAST TOMOSYNTHESIS BI: CPT

## 2019-12-03 PROCEDURE — 77067 SCR MAMMO BI INCL CAD: CPT

## 2019-12-17 DIAGNOSIS — N64.89 BREAST ASYMMETRY: Primary | ICD-10-CM

## 2020-01-03 ENCOUNTER — HOSPITAL ENCOUNTER (OUTPATIENT)
Dept: MAMMOGRAPHY | Facility: HOSPITAL | Age: 75
Discharge: HOME | End: 2020-01-03
Admitting: FAMILY MEDICINE

## 2020-01-03 ENCOUNTER — APPOINTMENT (OUTPATIENT)
Dept: ULTRASOUND IMAGING | Facility: HOSPITAL | Age: 75
End: 2020-01-03

## 2020-01-03 DIAGNOSIS — N64.89 BREAST ASYMMETRY: ICD-10-CM

## 2020-01-03 PROCEDURE — 77065 DX MAMMO INCL CAD UNI: CPT

## 2020-03-26 RX ORDER — ATORVASTATIN CALCIUM 20 MG/1
TABLET, FILM COATED ORAL
Qty: 45 TABLET | Refills: 1 | Status: SHIPPED | OUTPATIENT
Start: 2020-03-26 | End: 2020-09-21

## 2020-05-18 RX ORDER — RALOXIFENE HYDROCHLORIDE 60 MG/1
TABLET, FILM COATED ORAL
Qty: 90 TABLET | Refills: 0 | Status: SHIPPED | OUTPATIENT
Start: 2020-05-18 | End: 2020-08-28 | Stop reason: SDUPTHER

## 2020-05-22 RX ORDER — VERAPAMIL HYDROCHLORIDE 240 MG/1
CAPSULE, EXTENDED RELEASE ORAL
Qty: 90 CAPSULE | Refills: 0 | Status: SHIPPED | OUTPATIENT
Start: 2020-05-22 | End: 2020-08-28 | Stop reason: SDUPTHER

## 2020-08-28 RX ORDER — VERAPAMIL HYDROCHLORIDE 240 MG/1
240 CAPSULE, EXTENDED RELEASE ORAL NIGHTLY
Qty: 90 CAPSULE | Refills: 0 | Status: SHIPPED | OUTPATIENT
Start: 2020-08-28 | End: 2020-11-29

## 2020-08-28 RX ORDER — RALOXIFENE HYDROCHLORIDE 60 MG/1
60 TABLET, FILM COATED ORAL DAILY
Qty: 90 TABLET | Refills: 0 | Status: SHIPPED | OUTPATIENT
Start: 2020-08-28 | End: 2020-11-23

## 2020-08-28 NOTE — TELEPHONE ENCOUNTER
PT IS CALLING IN STATING THAT SHE NEEDS A MED REFILL ON HER     verapamil ER (VERELAN) 240 MG 24 hr capsule    raloxifene (EVISTA) 60 MG tablet    PT IS CURRENTLY OUT OF BOTH OF THESE MEDICATIONS      PT IS NOT COMFORTABLE COMING IN THE OFFICE RIGHT NOW DUE TO COVID.      PT CALL BACK   [phone]    PHARMACY [facility]  [address]  [phone]

## 2020-09-21 RX ORDER — ATORVASTATIN CALCIUM 20 MG/1
TABLET, FILM COATED ORAL
Qty: 45 TABLET | Refills: 1 | Status: SHIPPED | OUTPATIENT
Start: 2020-09-21 | End: 2021-08-31 | Stop reason: SDUPTHER

## 2020-10-16 RX ORDER — VALSARTAN AND HYDROCHLOROTHIAZIDE 80; 12.5 MG/1; MG/1
1 TABLET, FILM COATED ORAL DAILY
Qty: 90 TABLET | Refills: 3 | Status: SHIPPED | OUTPATIENT
Start: 2020-10-16 | End: 2021-08-31 | Stop reason: SDUPTHER

## 2020-10-16 NOTE — TELEPHONE ENCOUNTER
Caller: Gardenia Roper    Relationship: Self    Best call back number: [phone]*    Medication needed:   Requested Prescriptions     Pending Prescriptions Disp Refills   • valsartan-hydrochlorothiazide (Diovan HCT) 80-12.5 MG per tablet 90 tablet 3     Sig: Take 1 tablet by mouth Daily.       When do you need the refill by: ASAP    What details did the patient provide when requesting the medication: PATIENT IS COMPLETELY OUT     Does the patient have less than a 3 day supply:  [x] Yes  [] No    What is the patient's preferred pharmacy: [id number] [place], [place] - [address] AT [facility] [address] - [phone] [facility] [phone]

## 2020-11-13 ENCOUNTER — TRANSCRIBE ORDERS (OUTPATIENT)
Dept: ADMINISTRATIVE | Facility: HOSPITAL | Age: 75
End: 2020-11-13

## 2020-11-13 DIAGNOSIS — Z12.39 SCREENING BREAST EXAMINATION: Primary | ICD-10-CM

## 2020-11-23 RX ORDER — RALOXIFENE HYDROCHLORIDE 60 MG/1
60 TABLET, FILM COATED ORAL DAILY
Qty: 90 TABLET | Refills: 0 | Status: SHIPPED | OUTPATIENT
Start: 2020-11-23 | End: 2021-08-31 | Stop reason: SDUPTHER

## 2020-11-29 RX ORDER — VERAPAMIL HYDROCHLORIDE 240 MG/1
240 CAPSULE, EXTENDED RELEASE ORAL NIGHTLY
Qty: 90 CAPSULE | Refills: 0 | Status: SHIPPED | OUTPATIENT
Start: 2020-11-29 | End: 2021-03-01

## 2021-02-19 RX ORDER — RALOXIFENE HYDROCHLORIDE 60 MG/1
60 TABLET, FILM COATED ORAL DAILY
Qty: 90 TABLET | Refills: 0 | OUTPATIENT
Start: 2021-02-19

## 2021-03-01 RX ORDER — VERAPAMIL HYDROCHLORIDE 240 MG/1
240 CAPSULE, EXTENDED RELEASE ORAL NIGHTLY
Qty: 90 CAPSULE | Refills: 1 | Status: SHIPPED | OUTPATIENT
Start: 2021-03-01 | End: 2021-08-31 | Stop reason: SDUPTHER

## 2021-03-03 ENCOUNTER — APPOINTMENT (OUTPATIENT)
Dept: MAMMOGRAPHY | Facility: HOSPITAL | Age: 76
End: 2021-03-03

## 2021-05-21 ENCOUNTER — HOSPITAL ENCOUNTER (OUTPATIENT)
Dept: MAMMOGRAPHY | Facility: HOSPITAL | Age: 76
Discharge: HOME | End: 2021-05-21
Admitting: FAMILY MEDICINE

## 2021-05-21 DIAGNOSIS — Z12.39 SCREENING BREAST EXAMINATION: ICD-10-CM

## 2021-05-21 PROCEDURE — 77067 SCR MAMMO BI INCL CAD: CPT

## 2021-05-21 PROCEDURE — 77063 BREAST TOMOSYNTHESIS BI: CPT

## 2021-08-31 ENCOUNTER — OFFICE VISIT (OUTPATIENT)
Dept: INTERNAL MEDICINE | Facility: CLINIC | Age: 76
End: 2021-08-31

## 2021-08-31 VITALS
TEMPERATURE: 97.3 F | DIASTOLIC BLOOD PRESSURE: 72 MMHG | WEIGHT: 173 LBS | SYSTOLIC BLOOD PRESSURE: 148 MMHG | HEART RATE: 82 BPM | BODY MASS INDEX: 29.7 KG/M2 | OXYGEN SATURATION: 93 %

## 2021-08-31 DIAGNOSIS — Z78.0 OSTEOPENIA AFTER MENOPAUSE: Primary | ICD-10-CM

## 2021-08-31 DIAGNOSIS — I10 ESSENTIAL HYPERTENSION: ICD-10-CM

## 2021-08-31 DIAGNOSIS — M81.0 AGE-RELATED OSTEOPOROSIS WITHOUT CURRENT PATHOLOGICAL FRACTURE: ICD-10-CM

## 2021-08-31 DIAGNOSIS — E55.9 VITAMIN D DEFICIENCY: ICD-10-CM

## 2021-08-31 DIAGNOSIS — E83.52 SERUM CALCIUM ELEVATED: ICD-10-CM

## 2021-08-31 DIAGNOSIS — M85.80 OSTEOPENIA AFTER MENOPAUSE: Primary | ICD-10-CM

## 2021-08-31 DIAGNOSIS — E78.2 MIXED HYPERLIPIDEMIA: ICD-10-CM

## 2021-08-31 PROCEDURE — 99214 OFFICE O/P EST MOD 30 MIN: CPT | Performed by: FAMILY MEDICINE

## 2021-08-31 RX ORDER — ATORVASTATIN CALCIUM 20 MG/1
10 TABLET, FILM COATED ORAL DAILY
Qty: 45 TABLET | Refills: 3 | Status: SHIPPED | OUTPATIENT
Start: 2021-08-31 | End: 2022-01-09

## 2021-08-31 RX ORDER — VALSARTAN AND HYDROCHLOROTHIAZIDE 80; 12.5 MG/1; MG/1
1 TABLET, FILM COATED ORAL DAILY
Qty: 90 TABLET | Refills: 3 | Status: SHIPPED | OUTPATIENT
Start: 2021-08-31 | End: 2021-11-08

## 2021-08-31 RX ORDER — VERAPAMIL HYDROCHLORIDE 240 MG/1
240 CAPSULE, EXTENDED RELEASE ORAL NIGHTLY
Qty: 90 CAPSULE | Refills: 3 | Status: SHIPPED | OUTPATIENT
Start: 2021-08-31 | End: 2022-01-09

## 2021-08-31 RX ORDER — VERAPAMIL HYDROCHLORIDE 240 MG/1
CAPSULE, EXTENDED RELEASE ORAL
COMMUNITY
Start: 2021-06-07 | End: 2021-08-31 | Stop reason: SDUPTHER

## 2021-08-31 RX ORDER — MONTELUKAST SODIUM 10 MG/1
10 TABLET ORAL NIGHTLY
Qty: 90 TABLET | Refills: 3 | Status: SHIPPED | OUTPATIENT
Start: 2021-08-31 | End: 2022-01-09

## 2021-08-31 RX ORDER — RALOXIFENE HYDROCHLORIDE 60 MG/1
60 TABLET, FILM COATED ORAL DAILY
Qty: 90 TABLET | Refills: 3 | Status: SHIPPED | OUTPATIENT
Start: 2021-08-31 | End: 2022-01-09

## 2021-08-31 NOTE — PROGRESS NOTES
Chief Complaint  Hypertension, Hyperlipidemia, Heartburn, and Vitamin D Deficiency    Subjective          Gardenia Roper presents to [facility]  Delightful patient here with review of active management of hypertension hyperlipidemia.  Otherwise she gets routine mammograms and will set her up for a DEXA scan as she has been on Evista Flexeril loxapine daily for some time now.  We will refill raloxifene.  She has seen the allergist in the past.  She stopped getting allergy injections because she felt like she could not appreciate a difference.    Home              Objective   Vital Signs:   /72 (BP Location: Left arm, Patient Position: Sitting, Cuff Size: Adult)   Pulse 82   Temp 97.3 °F (36.3 °C) (Tympanic)   Wt 78.5 kg (173 lb)   SpO2 93%   BMI 29.70 kg/m²     Physical Exam  Vitals reviewed.   Constitutional:       Appearance: She is well-developed.   HENT:      Head: Normocephalic and atraumatic.      Right Ear: Tympanic membrane and external ear normal.      Left Ear: Tympanic membrane and external ear normal.   Eyes:      Conjunctiva/sclera: Conjunctivae normal.      Pupils: Pupils are equal, round, and reactive to light.   Neck:      Thyroid: No thyromegaly.      Vascular: No JVD.   Cardiovascular:      Rate and Rhythm: Normal rate and regular rhythm.      Heart sounds: Normal heart sounds.   Pulmonary:      Effort: Pulmonary effort is normal.      Breath sounds: Normal breath sounds.   Abdominal:      General: Bowel sounds are normal.      Palpations: Abdomen is soft.   Musculoskeletal:      Cervical back: Normal range of motion and neck supple.      Right hip: Decreased range of motion.      Left hip: Decreased range of motion.   Lymphadenopathy:      Cervical: No cervical adenopathy.   Skin:     General: Skin is warm and dry.      Findings: No rash.   Neurological:      Mental Status: She is alert and oriented to person, place, and time.      Cranial Nerves: No  cranial nerve deficit.      Coordination: Coordination normal.      Gait: Gait abnormal.   Psychiatric:         Behavior: Behavior normal.         Thought Content: Thought content normal.         Judgment: Judgment normal.        Result Review :                 Assessment and Plan    Diagnoses and all orders for this visit:    1. Osteopenia after menopause (Primary)  Comments:  Schedule DEXA scan continue Evista  Orders:  -     DEXA Bone Density Axial; Future  -     CBC & Differential  -     Comprehensive Metabolic Panel  -     Lipid Panel With / Chol / HDL Ratio  -     TSH  -     T4, Free  -     Vitamin D 25 Hydroxy  -     Urinalysis With Microscopic If Indicated (No Culture) - Urine, Clean Catch  -     Vitamin B12  -     PTH, Intact  -     Calcium, Ionized    2. Age-related osteoporosis without current pathological fracture   Comments:  Schedule DEXA scan continue Evista  Orders:  -     DEXA Bone Density Axial; Future    3. Mixed hyperlipidemia  Comments:  Lipitor 20 mg half tablet daily  Orders:  -     CBC & Differential  -     Comprehensive Metabolic Panel  -     Lipid Panel With / Chol / HDL Ratio  -     TSH  -     T4, Free  -     Vitamin D 25 Hydroxy  -     Urinalysis With Microscopic If Indicated (No Culture) - Urine, Clean Catch  -     Vitamin B12  -     PTH, Intact  -     Calcium, Ionized    4. Essential hypertension  Comments:  Valsartan hydrochlorothiazide 80/12.5 daily verapamil extended release 240 mg daily  Orders:  -     CBC & Differential  -     Comprehensive Metabolic Panel  -     Lipid Panel With / Chol / HDL Ratio  -     TSH  -     T4, Free  -     Vitamin D 25 Hydroxy  -     Urinalysis With Microscopic If Indicated (No Culture) - Urine, Clean Catch  -     Vitamin B12  -     PTH, Intact  -     Calcium, Ionized    5. Vitamin D deficiency  -     CBC & Differential  -     Comprehensive Metabolic Panel  -     Lipid Panel With / Chol / HDL Ratio  -     TSH  -     T4, Free  -     Vitamin D 25 Hydroxy  -      Urinalysis With Microscopic If Indicated (No Culture) - Urine, Clean Catch  -     Vitamin B12  -     PTH, Intact  -     Calcium, Ionized    6. Serum calcium elevated  -     PTH, Intact  -     Calcium, Ionized    Other orders  -     montelukast (SINGULAIR) 10 MG tablet; Take 1 tablet by mouth Every Night.  Dispense: 90 tablet; Refill: 3  -     raloxifene (EVISTA) 60 MG tablet; Take 1 tablet by mouth Daily.  Dispense: 90 tablet; Refill: 3  -     atorvastatin (LIPITOR) 20 MG tablet; Take 0.5 tablets by mouth Daily.  Dispense: 45 tablet; Refill: 3  -     verapamil ER (VERELAN) 240 MG 24 hr capsule; Take 1 capsule by mouth Every Night.  Dispense: 90 capsule; Refill: 3  -     valsartan-hydrochlorothiazide (Diovan HCT) 80-12.5 MG per tablet; Take 1 tablet by mouth Daily.  Dispense: 90 tablet; Refill: 3        Follow Up   Return in about 6 months (around 2/28/2022) for Medicare Wellness.  Patient was given instructions and counseling regarding her condition or for health maintenance advice. Please see specific information pulled into the AVS if appropriate.

## 2021-09-01 LAB
25(OH)D3+25(OH)D2 SERPL-MCNC: 40.5 NG/ML (ref 30–100)
ALBUMIN SERPL-MCNC: 4.3 G/DL (ref 3.7–4.7)
ALBUMIN/GLOB SERPL: 1.7 {RATIO} (ref 1.2–2.2)
ALP SERPL-CCNC: 51 IU/L (ref 48–121)
ALT SERPL-CCNC: 20 IU/L (ref 0–32)
AST SERPL-CCNC: 20 IU/L (ref 0–40)
BASOPHILS # BLD AUTO: 0.1 X10E3/UL (ref 0–0.2)
BASOPHILS NFR BLD AUTO: 1 %
BILIRUB SERPL-MCNC: 0.6 MG/DL (ref 0–1.2)
BUN SERPL-MCNC: 13 MG/DL (ref 8–27)
BUN/CREAT SERPL: 13 (ref 12–28)
CA-I SERPL ISE-MCNC: 6.5 MG/DL (ref 4.5–5.6)
CALCIUM SERPL-MCNC: 11.6 MG/DL (ref 8.7–10.3)
CHLORIDE SERPL-SCNC: 106 MMOL/L (ref 96–106)
CHOLEST SERPL-MCNC: 257 MG/DL (ref 100–199)
CHOLEST/HDLC SERPL: 4 RATIO (ref 0–4.4)
CO2 SERPL-SCNC: 22 MMOL/L (ref 20–29)
CREAT SERPL-MCNC: 0.97 MG/DL (ref 0.57–1)
EOSINOPHIL # BLD AUTO: 0.3 X10E3/UL (ref 0–0.4)
EOSINOPHIL NFR BLD AUTO: 3 %
ERYTHROCYTE [DISTWIDTH] IN BLOOD BY AUTOMATED COUNT: 13.1 % (ref 11.7–15.4)
GLOBULIN SER CALC-MCNC: 2.5 G/DL (ref 1.5–4.5)
GLUCOSE SERPL-MCNC: 89 MG/DL (ref 65–99)
HCT VFR BLD AUTO: 44.6 % (ref 34–46.6)
HDLC SERPL-MCNC: 65 MG/DL
HGB BLD-MCNC: 14.4 G/DL (ref 11.1–15.9)
IMM GRANULOCYTES # BLD AUTO: 0 X10E3/UL (ref 0–0.1)
IMM GRANULOCYTES NFR BLD AUTO: 0 %
LDLC SERPL CALC-MCNC: 153 MG/DL (ref 0–99)
LYMPHOCYTES # BLD AUTO: 2.1 X10E3/UL (ref 0.7–3.1)
LYMPHOCYTES NFR BLD AUTO: 27 %
MCH RBC QN AUTO: 30 PG (ref 26.6–33)
MCHC RBC AUTO-ENTMCNC: 32.3 G/DL (ref 31.5–35.7)
MCV RBC AUTO: 93 FL (ref 79–97)
MONOCYTES # BLD AUTO: 0.7 X10E3/UL (ref 0.1–0.9)
MONOCYTES NFR BLD AUTO: 10 %
NEUTROPHILS # BLD AUTO: 4.5 X10E3/UL (ref 1.4–7)
NEUTROPHILS NFR BLD AUTO: 59 %
PLATELET # BLD AUTO: 191 X10E3/UL (ref 150–450)
POTASSIUM SERPL-SCNC: 3.9 MMOL/L (ref 3.5–5.2)
PROT SERPL-MCNC: 6.8 G/DL (ref 6–8.5)
PTH-INTACT SERPL-MCNC: 35 PG/ML (ref 15–65)
RBC # BLD AUTO: 4.8 X10E6/UL (ref 3.77–5.28)
SODIUM SERPL-SCNC: 143 MMOL/L (ref 134–144)
T4 FREE SERPL-MCNC: 1.21 NG/DL (ref 0.82–1.77)
TRIGL SERPL-MCNC: 216 MG/DL (ref 0–149)
TSH SERPL DL<=0.005 MIU/L-ACNC: 1.7 UIU/ML (ref 0.45–4.5)
UNABLE TO VOID: NORMAL
VIT B12 SERPL-MCNC: 809 PG/ML (ref 232–1245)
VLDLC SERPL CALC-MCNC: 39 MG/DL (ref 5–40)
WBC # BLD AUTO: 7.6 X10E3/UL (ref 3.4–10.8)

## 2021-11-08 RX ORDER — VALSARTAN AND HYDROCHLOROTHIAZIDE 80; 12.5 MG/1; MG/1
1 TABLET, FILM COATED ORAL DAILY
Qty: 90 TABLET | Refills: 1 | Status: SHIPPED | OUTPATIENT
Start: 2021-11-08 | End: 2022-01-09

## 2022-01-09 ENCOUNTER — APPOINTMENT (OUTPATIENT)
Dept: GENERAL RADIOLOGY | Facility: HOSPITAL | Age: 77
End: 2022-01-09

## 2022-01-09 ENCOUNTER — HOSPITAL ENCOUNTER (INPATIENT)
Facility: HOSPITAL | Age: 77
LOS: 5 days | Discharge: NURSING, REHAB OR LONG-TERM CARE | End: 2022-01-14
Attending: EMERGENCY MEDICINE | Admitting: HOSPITALIST

## 2022-01-09 ENCOUNTER — APPOINTMENT (OUTPATIENT)
Dept: CT IMAGING | Facility: HOSPITAL | Age: 77
End: 2022-01-09

## 2022-01-09 DIAGNOSIS — D72.829 LEUKOCYTOSIS, UNSPECIFIED TYPE: ICD-10-CM

## 2022-01-09 DIAGNOSIS — E87.1 HYPONATREMIA: ICD-10-CM

## 2022-01-09 DIAGNOSIS — R19.7 DIARRHEA, UNSPECIFIED TYPE: ICD-10-CM

## 2022-01-09 DIAGNOSIS — N17.9 AKI (ACUTE KIDNEY INJURY): Primary | ICD-10-CM

## 2022-01-09 DIAGNOSIS — N94.9 ADNEXAL CYST: ICD-10-CM

## 2022-01-09 DIAGNOSIS — I95.9 TRANSIENT HYPOTENSION: ICD-10-CM

## 2022-01-09 PROBLEM — R10.2 SUPRAPUBIC PAIN: Status: ACTIVE | Noted: 2022-01-09

## 2022-01-09 PROBLEM — E87.20 ACIDOSIS: Status: ACTIVE | Noted: 2022-01-09

## 2022-01-09 PROBLEM — A49.8 CLOSTRIDIUM DIFFICILE INFECTION: Status: ACTIVE | Noted: 2022-01-09

## 2022-01-09 LAB
ADV 40+41 DNA STL QL NAA+NON-PROBE: NOT DETECTED
ALBUMIN SERPL-MCNC: 3 G/DL (ref 3.5–5.2)
ALBUMIN/GLOB SERPL: 1.1 G/DL
ALP SERPL-CCNC: 83 U/L (ref 39–117)
ALT SERPL W P-5'-P-CCNC: 31 U/L (ref 1–33)
ANION GAP SERPL CALCULATED.3IONS-SCNC: 15.3 MMOL/L (ref 5–15)
AST SERPL-CCNC: 33 U/L (ref 1–32)
ASTRO TYP 1-8 RNA STL QL NAA+NON-PROBE: NOT DETECTED
BILIRUB SERPL-MCNC: 0.5 MG/DL (ref 0–1.2)
BUN SERPL-MCNC: 73 MG/DL (ref 8–23)
BUN/CREAT SERPL: 20.1 (ref 7–25)
C CAYETANENSIS DNA STL QL NAA+NON-PROBE: NOT DETECTED
C COLI+JEJ+UPSA DNA STL QL NAA+NON-PROBE: NOT DETECTED
C DIFF TOX GENS STL QL NAA+PROBE: POSITIVE
CALCIUM SPEC-SCNC: 10.1 MG/DL (ref 8.6–10.5)
CHLORIDE SERPL-SCNC: 92 MMOL/L (ref 98–107)
CO2 SERPL-SCNC: 19.7 MMOL/L (ref 22–29)
CREAT SERPL-MCNC: 3.63 MG/DL (ref 0.57–1)
CRYPTOSP DNA STL QL NAA+NON-PROBE: NOT DETECTED
D-LACTATE SERPL-SCNC: 1.5 MMOL/L (ref 0.5–2)
DEPRECATED RDW RBC AUTO: 42.3 FL (ref 37–54)
E HISTOLYT DNA STL QL NAA+NON-PROBE: NOT DETECTED
EAEC PAA PLAS AGGR+AATA ST NAA+NON-PRB: NOT DETECTED
EC STX1+STX2 GENES STL QL NAA+NON-PROBE: NOT DETECTED
EPEC EAE GENE STL QL NAA+NON-PROBE: NOT DETECTED
ERYTHROCYTE [DISTWIDTH] IN BLOOD BY AUTOMATED COUNT: 13.1 % (ref 12.3–15.4)
ETEC LTA+ST1A+ST1B TOX ST NAA+NON-PROBE: NOT DETECTED
G LAMBLIA DNA STL QL NAA+NON-PROBE: NOT DETECTED
GFR SERPL CREATININE-BSD FRML MDRD: 12 ML/MIN/1.73
GFR SERPL CREATININE-BSD FRML MDRD: ABNORMAL ML/MIN/{1.73_M2}
GLOBULIN UR ELPH-MCNC: 2.7 GM/DL
GLUCOSE SERPL-MCNC: 81 MG/DL (ref 65–99)
HCT VFR BLD AUTO: 43.9 % (ref 34–46.6)
HGB BLD-MCNC: 14.8 G/DL (ref 12–15.9)
LIPASE SERPL-CCNC: 29 U/L (ref 13–60)
LYMPHOCYTES # BLD MANUAL: 1.59 10*3/MM3 (ref 0.7–3.1)
LYMPHOCYTES NFR BLD MANUAL: 17 % (ref 5–12)
MAGNESIUM SERPL-MCNC: 3 MG/DL (ref 1.6–2.4)
MCH RBC QN AUTO: 30.1 PG (ref 26.6–33)
MCHC RBC AUTO-ENTMCNC: 33.7 G/DL (ref 31.5–35.7)
MCV RBC AUTO: 89.4 FL (ref 79–97)
MONOCYTES # BLD: 3.86 10*3/MM3 (ref 0.1–0.9)
NEUTROPHILS # BLD AUTO: 17.27 10*3/MM3 (ref 1.7–7)
NEUTROPHILS NFR BLD MANUAL: 76 % (ref 42.7–76)
NOROVIRUS GI+II RNA STL QL NAA+NON-PROBE: NOT DETECTED
NRBC BLD AUTO-RTO: 0 /100 WBC (ref 0–0.2)
P SHIGELLOIDES DNA STL QL NAA+NON-PROBE: NOT DETECTED
PLATELET # BLD AUTO: 233 10*3/MM3 (ref 140–450)
PMV BLD AUTO: 11.2 FL (ref 6–12)
POTASSIUM SERPL-SCNC: 3.6 MMOL/L (ref 3.5–5.2)
PROT SERPL-MCNC: 5.7 G/DL (ref 6–8.5)
QT INTERVAL: 406 MS
RBC # BLD AUTO: 4.91 10*6/MM3 (ref 3.77–5.28)
RBC MORPH BLD: NORMAL
RVA RNA STL QL NAA+NON-PROBE: NOT DETECTED
S ENT+BONG DNA STL QL NAA+NON-PROBE: NOT DETECTED
SAPO I+II+IV+V RNA STL QL NAA+NON-PROBE: NOT DETECTED
SARS-COV-2 RNA PNL SPEC NAA+PROBE: NOT DETECTED
SHIGELLA SP+EIEC IPAH ST NAA+NON-PROBE: NOT DETECTED
SMALL PLATELETS BLD QL SMEAR: ABNORMAL
SODIUM SERPL-SCNC: 127 MMOL/L (ref 136–145)
TROPONIN T SERPL-MCNC: <0.01 NG/ML (ref 0–0.03)
URATE SERPL-MCNC: 12 MG/DL (ref 2.4–5.7)
V CHOL+PARA+VUL DNA STL QL NAA+NON-PROBE: NOT DETECTED
V CHOLERAE DNA STL QL NAA+NON-PROBE: NOT DETECTED
VARIANT LYMPHS NFR BLD MANUAL: 7 % (ref 19.6–45.3)
WBC MORPH BLD: NORMAL
WBC NRBC COR # BLD: 22.73 10*3/MM3 (ref 3.4–10.8)
Y ENTEROCOL DNA STL QL NAA+NON-PROBE: NOT DETECTED

## 2022-01-09 PROCEDURE — 84550 ASSAY OF BLOOD/URIC ACID: CPT | Performed by: HOSPITALIST

## 2022-01-09 PROCEDURE — 71045 X-RAY EXAM CHEST 1 VIEW: CPT

## 2022-01-09 PROCEDURE — 83735 ASSAY OF MAGNESIUM: CPT | Performed by: EMERGENCY MEDICINE

## 2022-01-09 PROCEDURE — 0097U HC BIOFIRE FILMARRAY GI PANEL: CPT | Performed by: EMERGENCY MEDICINE

## 2022-01-09 PROCEDURE — 80053 COMPREHEN METABOLIC PANEL: CPT | Performed by: EMERGENCY MEDICINE

## 2022-01-09 PROCEDURE — 25010000002 PIPERACILLIN SOD-TAZOBACTAM PER 1 G: Performed by: HOSPITALIST

## 2022-01-09 PROCEDURE — 84484 ASSAY OF TROPONIN QUANT: CPT | Performed by: EMERGENCY MEDICINE

## 2022-01-09 PROCEDURE — 99284 EMERGENCY DEPT VISIT MOD MDM: CPT

## 2022-01-09 PROCEDURE — 83605 ASSAY OF LACTIC ACID: CPT | Performed by: EMERGENCY MEDICINE

## 2022-01-09 PROCEDURE — 83690 ASSAY OF LIPASE: CPT | Performed by: EMERGENCY MEDICINE

## 2022-01-09 PROCEDURE — 93005 ELECTROCARDIOGRAM TRACING: CPT | Performed by: EMERGENCY MEDICINE

## 2022-01-09 PROCEDURE — 74176 CT ABD & PELVIS W/O CONTRAST: CPT

## 2022-01-09 PROCEDURE — 85007 BL SMEAR W/DIFF WBC COUNT: CPT | Performed by: EMERGENCY MEDICINE

## 2022-01-09 PROCEDURE — 87493 C DIFF AMPLIFIED PROBE: CPT | Performed by: EMERGENCY MEDICINE

## 2022-01-09 PROCEDURE — 87040 BLOOD CULTURE FOR BACTERIA: CPT | Performed by: EMERGENCY MEDICINE

## 2022-01-09 PROCEDURE — 87635 SARS-COV-2 COVID-19 AMP PRB: CPT | Performed by: EMERGENCY MEDICINE

## 2022-01-09 PROCEDURE — 85025 COMPLETE CBC W/AUTO DIFF WBC: CPT | Performed by: EMERGENCY MEDICINE

## 2022-01-09 PROCEDURE — 93010 ELECTROCARDIOGRAM REPORT: CPT | Performed by: INTERNAL MEDICINE

## 2022-01-09 RX ORDER — ACETAMINOPHEN 650 MG/1
650 SUPPOSITORY RECTAL EVERY 4 HOURS PRN
Status: DISCONTINUED | OUTPATIENT
Start: 2022-01-09 | End: 2022-01-14 | Stop reason: HOSPADM

## 2022-01-09 RX ORDER — GLUCOSAM/CHON-MSM1/C/MANG/BOSW 750-644 MG
1 TABLET ORAL DAILY
COMMUNITY
End: 2022-01-14 | Stop reason: HOSPADM

## 2022-01-09 RX ORDER — VALSARTAN AND HYDROCHLOROTHIAZIDE 80; 12.5 MG/1; MG/1
1 TABLET, FILM COATED ORAL DAILY
COMMUNITY
End: 2022-01-14 | Stop reason: HOSPADM

## 2022-01-09 RX ORDER — OMEPRAZOLE 40 MG/1
40 CAPSULE, DELAYED RELEASE ORAL DAILY PRN
COMMUNITY
End: 2022-01-14 | Stop reason: HOSPADM

## 2022-01-09 RX ORDER — MONTELUKAST SODIUM 10 MG/1
10 TABLET ORAL NIGHTLY
COMMUNITY
End: 2022-08-26

## 2022-01-09 RX ORDER — MELATONIN
1000 DAILY
COMMUNITY

## 2022-01-09 RX ORDER — ONDANSETRON 4 MG/1
4 TABLET, FILM COATED ORAL EVERY 6 HOURS PRN
Status: DISCONTINUED | OUTPATIENT
Start: 2022-01-09 | End: 2022-01-14 | Stop reason: HOSPADM

## 2022-01-09 RX ORDER — ACETAMINOPHEN 160 MG/5ML
650 SOLUTION ORAL EVERY 4 HOURS PRN
Status: DISCONTINUED | OUTPATIENT
Start: 2022-01-09 | End: 2022-01-14 | Stop reason: HOSPADM

## 2022-01-09 RX ORDER — SODIUM CHLORIDE 0.9 % (FLUSH) 0.9 %
10 SYRINGE (ML) INJECTION EVERY 12 HOURS SCHEDULED
Status: DISCONTINUED | OUTPATIENT
Start: 2022-01-09 | End: 2022-01-14 | Stop reason: HOSPADM

## 2022-01-09 RX ORDER — RALOXIFENE HYDROCHLORIDE 60 MG/1
60 TABLET, FILM COATED ORAL DAILY
COMMUNITY
End: 2022-08-26

## 2022-01-09 RX ORDER — VANCOMYCIN HYDROCHLORIDE 125 MG/1
125 CAPSULE ORAL EVERY 6 HOURS SCHEDULED
Status: DISCONTINUED | OUTPATIENT
Start: 2022-01-09 | End: 2022-01-14 | Stop reason: HOSPADM

## 2022-01-09 RX ORDER — CETIRIZINE HYDROCHLORIDE 10 MG/1
10 TABLET ORAL DAILY
COMMUNITY
End: 2022-01-14 | Stop reason: HOSPADM

## 2022-01-09 RX ORDER — ACETAMINOPHEN 325 MG/1
650 TABLET ORAL EVERY 4 HOURS PRN
Status: DISCONTINUED | OUTPATIENT
Start: 2022-01-09 | End: 2022-01-14 | Stop reason: HOSPADM

## 2022-01-09 RX ORDER — SODIUM CHLORIDE, SODIUM LACTATE, POTASSIUM CHLORIDE, CALCIUM CHLORIDE 600; 310; 30; 20 MG/100ML; MG/100ML; MG/100ML; MG/100ML
100 INJECTION, SOLUTION INTRAVENOUS CONTINUOUS
Status: DISCONTINUED | OUTPATIENT
Start: 2022-01-09 | End: 2022-01-12

## 2022-01-09 RX ORDER — SODIUM CHLORIDE 0.9 % (FLUSH) 0.9 %
10 SYRINGE (ML) INJECTION AS NEEDED
Status: DISCONTINUED | OUTPATIENT
Start: 2022-01-09 | End: 2022-01-14 | Stop reason: HOSPADM

## 2022-01-09 RX ORDER — ONDANSETRON 2 MG/ML
4 INJECTION INTRAMUSCULAR; INTRAVENOUS EVERY 6 HOURS PRN
Status: DISCONTINUED | OUTPATIENT
Start: 2022-01-09 | End: 2022-01-14 | Stop reason: HOSPADM

## 2022-01-09 RX ORDER — ATORVASTATIN CALCIUM 20 MG/1
10 TABLET, FILM COATED ORAL DAILY
COMMUNITY
End: 2022-08-26

## 2022-01-09 RX ORDER — NITROGLYCERIN 0.4 MG/1
0.4 TABLET SUBLINGUAL
Status: DISCONTINUED | OUTPATIENT
Start: 2022-01-09 | End: 2022-01-14 | Stop reason: HOSPADM

## 2022-01-09 RX ADMIN — VANCOMYCIN HYDROCHLORIDE 125 MG: 125 CAPSULE ORAL at 20:59

## 2022-01-09 RX ADMIN — SODIUM CHLORIDE 1000 ML: 9 INJECTION, SOLUTION INTRAVENOUS at 11:41

## 2022-01-09 RX ADMIN — SODIUM CHLORIDE, POTASSIUM CHLORIDE, SODIUM LACTATE AND CALCIUM CHLORIDE 1000 ML: 600; 310; 30; 20 INJECTION, SOLUTION INTRAVENOUS at 12:41

## 2022-01-09 RX ADMIN — SODIUM CHLORIDE, PRESERVATIVE FREE 10 ML: 5 INJECTION INTRAVENOUS at 20:59

## 2022-01-09 RX ADMIN — TAZOBACTAM SODIUM AND PIPERACILLIN SODIUM 3.38 G: 375; 3 INJECTION, SOLUTION INTRAVENOUS at 18:00

## 2022-01-09 RX ADMIN — SODIUM CHLORIDE, POTASSIUM CHLORIDE, SODIUM LACTATE AND CALCIUM CHLORIDE 100 ML/HR: 600; 310; 30; 20 INJECTION, SOLUTION INTRAVENOUS at 18:00

## 2022-01-09 NOTE — H&P
Name: Gardenia Roper ADMIT: 2022   : 1945  PCP: Jim Hampton Jr., MD    MRN: [id number] LOS: 0 days   AGE/SEX: 76 y.o. female  ROOM:      Chief Complaint   Patient presents with   • Diarrhea   • Altered Mental Status   • Weakness - Generalized       Subjective   Patient is a 76 y.o. female who presents to [facility] with the above chief complaint.  About 5 days ago she started having diarrhea.  She is going about every other hour she is having a very loose watery bowel movement.  Over the last 3 days she started to notice mucus as well.  She is not been experiencing any blood.  She has had some suprapubic abdominal pain and lower abdominal tenderness but otherwise is denying any overt abdominal pain.  She is had no nausea or vomiting but has not had much of an appetite at all.  She has not been able to sleep the diarrhea wakes her up at night every hour she has to have a bowel movement.  She has been experiencing tenesmus as well as urinary frequency but oftentimes does not void much.  She denies any fevers sick contacts new medications or other changes.    History of Present Illness    Past Medical History:   Diagnosis Date   • Hyperlipidemia    • Hypertension    • Kidney stone on right side    • Osteoporosis    • Ovarian cyst      Past Surgical History:   Procedure Laterality Date   • ARTERIAL ANEURYSM REPAIR     • BRAIN SURGERY     • COSMETIC SURGERY     • OVARIAN CYST DRAINAGE/EXCISION     • OVARIAN CYST REMOVAL Left    • SINUS SURGERY     • URETEROSCOPY LASER LITHOTRIPSY WITH STENT INSERTION Right 2016    Procedure: CYSTO, RIGHT URETEROSCOPY , RIGHT STENT PLACEMENT, BILATERAL RETROGRADES;  Surgeon: Yuri Singh MD;  Location: [facility];  Service:    • URETEROSCOPY LASER LITHOTRIPSY WITH STENT INSERTION Right 2016    Procedure: RIGHT URETEROSCOPY LASER LITHOTRIPSY WITH STONE BASKET & STENT REMOVAL;  Surgeon: Yuri Singh MD;  Location:   [facility];  Service:      Family History   Problem Relation Age of Onset   • Diabetes Other    • Hypertension Other    • Lung cancer Other    • Stroke Other      Social History     Tobacco Use   • Smoking status: Former Smoker     Packs/day: 1.00     Quit date: 1986     Years since quittin.7   • Smokeless tobacco: Never Used   • Tobacco comment: quit 25 years ago   Vaping Use   • Vaping Use: Never used   Substance Use Topics   • Alcohol use: Yes     Alcohol/week: 1.0 standard drink     Types: 1 Glasses of wine per week     Comment: 2-3 times a week, one glass of wine, none in last 3-4 days   • Drug use: No     (Not in a hospital admission)    Allergies:  Patient has no known allergies.    Review of Systems   Constitutional: Positive for appetite change. Negative for chills, fatigue and fever.   HENT: Negative for congestion, rhinorrhea and sore throat.    Eyes: Negative for photophobia, redness and visual disturbance.   Respiratory: Negative for cough, chest tightness and shortness of breath.    Cardiovascular: Negative for chest pain, palpitations and leg swelling.   Gastrointestinal: Positive for abdominal pain and diarrhea. Negative for constipation, nausea and vomiting.   Endocrine: Negative for polydipsia, polyphagia and polyuria.   Genitourinary: Positive for difficulty urinating, dysuria, frequency and urgency. Negative for hematuria.   Musculoskeletal: Negative for back pain, myalgias and neck stiffness.   Allergic/Immunologic: Negative for environmental allergies and immunocompromised state.   Neurological: Negative for dizziness, facial asymmetry and headaches.   Hematological: Negative for adenopathy. Does not bruise/bleed easily.   Psychiatric/Behavioral: Negative for agitation, behavioral problems and confusion.        Objective    Vital Signs  Temp:  [97.4 °F (36.3 °C)] 97.4 °F (36.3 °C)  Heart Rate:  [80-90] 82  Resp:  [16] 16  BP: ()/(48-77) 114/53  SpO2:  [91 %-97 %] 92 %  on   ;    Device (Oxygen Therapy): room air  Body mass index is 30.55 kg/m².    Physical Exam  Vitals and nursing note reviewed.   Constitutional:       Appearance: Normal appearance.   Cardiovascular:      Rate and Rhythm: Normal rate and regular rhythm.      Heart sounds: Normal heart sounds.   Pulmonary:      Effort: No respiratory distress.      Breath sounds: Normal breath sounds.   Abdominal:      General: Bowel sounds are normal.      Palpations: Abdomen is soft.      Tenderness: There is abdominal tenderness.   Musculoskeletal:      Right lower leg: No edema.      Left lower leg: No edema.   Skin:     General: Skin is warm and dry.   Neurological:      General: No focal deficit present.      Mental Status: She is alert and oriented to person, place, and time.   Psychiatric:         Mood and Affect: Mood normal.         Behavior: Behavior normal.         Results Review:   I reviewed the patient's new clinical results.  Results from last 7 days   Lab Units 01/09/22  1142   WBC 10*3/mm3 22.73*   HEMOGLOBIN g/dL 14.8   PLATELETS 10*3/mm3 233     Results from last 7 days   Lab Units 01/09/22  1142   SODIUM mmol/L 127*   POTASSIUM mmol/L 3.6   CHLORIDE mmol/L 92*   CO2 mmol/L 19.7*   BUN mg/dL 73*   CREATININE mg/dL 3.63*   GLUCOSE mg/dL 81   ALBUMIN g/dL 3.00*   BILIRUBIN mg/dL 0.5   ALK PHOS U/L 83   AST (SGOT) U/L 33*   ALT (SGPT) U/L 31   Estimated Creatinine Clearance: 13.1 mL/min (A) (by C-G formula based on SCr of 3.63 mg/dL (H)).  Results from last 7 days   Lab Units 01/09/22  1142   TROPONIN T ng/mL <0.010         Invalid input(s): LDLCALC    XR Chest 1 View   Final Result        Assessment/Plan       TRACY (acute kidney injury) (HCC)    Diarrhea of presumed infectious origin    Suprapubic pain    Leukocytosis    Hyponatremia    Acidosis    Clostridium difficile infection      Assessment & Plan  This is a 76-year-old female with history of hypertension and obesity who presents to the hospital with diarrhea and was  found to have acute renal failure and probable infectious diarrhea  -As in writing her note her C. difficile test has come back positive.  We will start her on oral vancomycin.  Had initially planned to give her 1 dose of IV Zosyn to treat for possible colitis.  -Continue contact precautions and supportive care  -Noted acute renal failure with underlying infectious process meets criteria for acute sepsis.  -Continue IV fluids and monitor renal function closely.  I suspect that this is probably related to dehydration but the possibility for post renal obstruction is there.  I did check a CT scan of the abdomen and pelvis to both get a look at the bladder as well as the colon.  On cursory review independently I do see that her bladder is rather large and her colon is somewhat inflamed.  I will follow-up the official radiology read when appropriate and available  -Her hyponatremia is probably related to hypovolemia this should improve with IV fluids  -Acidosis should correct as well correction the underlying metabolic abnormalities  -I have asked the nurse to do a bladder scan.  Likely to place a Castro catheter for significant urinary retention and acute renal failure  -Mechanical DVT prophylaxis  -Full code        I discussed the patients findings and my recommendations with patient, family and nursing staff.          Baldev Mcgovern MD  [facility]  01/09/22  16:04 EST

## 2022-01-09 NOTE — ED PROVIDER NOTES
EMERGENCY DEPARTMENT ENCOUNTER  I wore full protective equipment throughout this patient encounter including a N95 mask, eye shield, gown and gloves. Hand hygiene was performed before donning protective equipment and after removal when leaving the room.    Room Number:  12/12  Date of encounter:  1/9/2022  PCP: Jim Hampton Jr., MD    HPI:  Context: Gardenia Roper is a 76 y.o. female who presents to the ED c/o chief complaint of diarrhea.  Patient reports diarrhea since Wednesday.  Patient is unable to quantify but reports that she is having diarrhea every 45 minutes.  Patient denies any blood or mucus in her diarrhea.  No abdominal pain.  Patient denies any nausea vomiting but does report that she has had extremely poor p.o.  Intake secondary to lack of appetite.  Patient denies any dysuria, no increased urinary frequency or urgency, does report that she has been urinating less frequently, has been dark in color.  She does endorse lightheadedness and dizziness, reports that occurs with standing or ambulating, improved with sitting down or resting.  Patient denies any syncope, no fall or trauma.  No chest pain or shortness of breath, no cough or upper respiratory symptoms, no fever or systemic symptoms.  Patient denies any recent antibiotics, no history of C. difficile in the past.  Patient denies any unusual foods, no recent travel, no sick contacts.  Patient has been vaccinated against COVID-19.  Family reports some mild confusion, coming and going.  Patient is currently alert and oriented, responding appropriately.    MEDICAL HISTORY REVIEW  Reviewed in EPIC    PAST MEDICAL HISTORY  Active Ambulatory Problems     Diagnosis Date Noted   • Right ureteral stone 04/19/2016   • Hypertension 05/24/2016   • Hyperlipidemia 05/24/2016   • Hypercalcemia 05/24/2016   • Gastroesophageal reflux disease 05/24/2016   • Atopic rhinitis 05/24/2016   • Medicare annual wellness visit, subsequent 09/10/2019   • Vitamin D  deficiency 09/10/2019     Resolved Ambulatory Problems     Diagnosis Date Noted   • Depression 2016   • Mixed anxiety depressive disorder 2016     Past Medical History:   Diagnosis Date   • Kidney stone on right side    • Osteoporosis    • Ovarian cyst        PAST SURGICAL HISTORY  Past Surgical History:   Procedure Laterality Date   • ARTERIAL ANEURYSM REPAIR  2006   • BRAIN SURGERY     • COSMETIC SURGERY     • OVARIAN CYST DRAINAGE/EXCISION     • OVARIAN CYST REMOVAL Left    • SINUS SURGERY     • URETEROSCOPY LASER LITHOTRIPSY WITH STENT INSERTION Right 2016    Procedure: CYSTO, RIGHT URETEROSCOPY , RIGHT STENT PLACEMENT, BILATERAL RETROGRADES;  Surgeon: Yuri Singh MD;  Location: [facility];  Service:    • URETEROSCOPY LASER LITHOTRIPSY WITH STENT INSERTION Right 2016    Procedure: RIGHT URETEROSCOPY LASER LITHOTRIPSY WITH STONE BASKET & STENT REMOVAL;  Surgeon: Yuri Singh MD;  Location: [facility];  Service:        FAMILY HISTORY  Family History   Problem Relation Age of Onset   • Diabetes Other    • Hypertension Other    • Lung cancer Other    • Stroke Other        SOCIAL HISTORY  Social History     Socioeconomic History   • Marital status:    Tobacco Use   • Smoking status: Former Smoker     Packs/day: 1.00     Quit date: 1986     Years since quittin.7   • Smokeless tobacco: Never Used   • Tobacco comment: quit 25 years ago   Vaping Use   • Vaping Use: Never used   Substance and Sexual Activity   • Alcohol use: Yes     Alcohol/week: 1.0 standard drink     Types: 1 Glasses of wine per week     Comment: 2-3 times a week, one glass of wine, none in last 3-4 days   • Drug use: No   • Sexual activity: Defer       ALLERGIES  Patient has no known allergies.    The patient's allergies have been reviewed    REVIEW OF SYSTEMS  All systems reviewed and negative except for those discussed in HPI.     PHYSICAL EXAM  I have reviewed the triage vital signs  and nursing notes.  ED Triage Vitals [01/09/22 1108]   Temp Heart Rate Resp BP SpO2   97.4 °F (36.3 °C) 90 16 130/77 97 %      Temp src Heart Rate Source Patient Position BP Location FiO2 (%)   Tympanic -- -- -- --       General: No acute distress.  HENT: NCAT, PERRL, Nares patent.  Dry mucous membranes  Eyes: no scleral icterus.  Neck: trachea midline, no ROM limitations.  CV: regular rhythm, regular rate.  Respiratory: normal effort, CTAB.  Abdomen: soft, nondistended, NTTP, no rebound tenderness, no guarding or rigidity.  Musculoskeletal: no deformity.  Neuro: Alert and oriented x3, no facial droop, speech clear, no dysarthria or aphasia, moves all extremities well, sensation intact light touch all extremities, no focal deficits  Skin: warm, dry.  Poor skin turgor    LAB RESULTS  Recent Results (from the past 24 hour(s))   Comprehensive Metabolic Panel    Collection Time: 01/09/22 11:42 AM    Specimen: Blood   Result Value Ref Range    Glucose 81 65 - 99 mg/dL    BUN 73 (H) 8 - 23 mg/dL    Creatinine 3.63 (H) 0.57 - 1.00 mg/dL    Sodium 127 (L) 136 - 145 mmol/L    Potassium 3.6 3.5 - 5.2 mmol/L    Chloride 92 (L) 98 - 107 mmol/L    CO2 19.7 (L) 22.0 - 29.0 mmol/L    Calcium 10.1 8.6 - 10.5 mg/dL    Total Protein 5.7 (L) 6.0 - 8.5 g/dL    Albumin 3.00 (L) 3.50 - 5.20 g/dL    ALT (SGPT) 31 1 - 33 U/L    AST (SGOT) 33 (H) 1 - 32 U/L    Alkaline Phosphatase 83 39 - 117 U/L    Total Bilirubin 0.5 0.0 - 1.2 mg/dL    eGFR Non African Amer 12 (L) >60 mL/min/1.73    eGFR  African Amer      Globulin 2.7 gm/dL    A/G Ratio 1.1 g/dL    BUN/Creatinine Ratio 20.1 7.0 - 25.0    Anion Gap 15.3 (H) 5.0 - 15.0 mmol/L   Lipase    Collection Time: 01/09/22 11:42 AM    Specimen: Blood   Result Value Ref Range    Lipase 29 13 - 60 U/L   Magnesium    Collection Time: 01/09/22 11:42 AM    Specimen: Blood   Result Value Ref Range    Magnesium 3.0 (H) 1.6 - 2.4 mg/dL   Troponin    Collection Time: 01/09/22 11:42 AM    Specimen: Blood    Result Value Ref Range    Troponin T <0.010 0.000 - 0.030 ng/mL   CBC Auto Differential    Collection Time: 01/09/22 11:42 AM    Specimen: Blood   Result Value Ref Range    WBC 22.73 (H) 3.40 - 10.80 10*3/mm3    RBC 4.91 3.77 - 5.28 10*6/mm3    Hemoglobin 14.8 12.0 - 15.9 g/dL    Hematocrit 43.9 34.0 - 46.6 %    MCV 89.4 79.0 - 97.0 fL    MCH 30.1 26.6 - 33.0 pg    MCHC 33.7 31.5 - 35.7 g/dL    RDW 13.1 12.3 - 15.4 %    RDW-SD 42.3 37.0 - 54.0 fl    MPV 11.2 6.0 - 12.0 fL    Platelets 233 140 - 450 10*3/mm3    nRBC 0.0 0.0 - 0.2 /100 WBC   Manual Differential    Collection Time: 01/09/22 11:42 AM    Specimen: Blood   Result Value Ref Range    Neutrophil % 76.0 42.7 - 76.0 %    Lymphocyte % 7.0 (L) 19.6 - 45.3 %    Monocyte % 17.0 (H) 5.0 - 12.0 %    Neutrophils Absolute 17.27 (H) 1.70 - 7.00 10*3/mm3    Lymphocytes Absolute 1.59 0.70 - 3.10 10*3/mm3    Monocytes Absolute 3.86 (H) 0.10 - 0.90 10*3/mm3    RBC Morphology Normal Normal    WBC Morphology Normal Normal    Platelet Estimate Decreased Normal   COVID-19, [facility] IN-HOUSE CEPHEID/SHANNAN NP SWAB IN TRANSPORT MEDIA 8-12 HR TAT - Swab, Nasopharynx    Collection Time: 01/09/22 11:45 AM    Specimen: Nasopharynx; Swab   Result Value Ref Range    COVID19 Not Detected Not Detected - Ref. Range   ECG 12 Lead    Collection Time: 01/09/22 12:04 PM   Result Value Ref Range    QT Interval 406 ms   Lactic Acid, Plasma    Collection Time: 01/09/22 12:46 PM    Specimen: Blood   Result Value Ref Range    Lactate 1.5 0.5 - 2.0 mmol/L       I ordered the above labs and reviewed the results.    RADIOLOGY  XR Chest 1 View    Result Date: 1/9/2022  ONE VIEW PORTABLE CHEST  HISTORY: Dizziness. Hypertension.  FINDINGS: The lungs are well-expanded and clear except for a calcified granuloma at the left base. The heart is borderline enlarged and there is no acute disease or change from 09/10/2021.        I ordered the above noted radiological studies. I reviewed the images and results. I  agree with the radiologist interpretation.    PROCEDURES  Procedures    MEDICATIONS GIVEN IN ER  Medications   lactated ringers bolus 1,000 mL (1,000 mL Intravenous New Bag 1/9/22 1241)   sodium chloride 0.9 % bolus 1,000 mL (0 mL Intravenous Stopped 1/9/22 1240)       PROGRESS, DATA ANALYSIS, CONSULTS, AND MEDICAL DECISION MAKING  A complete history and physical exam have been performed.  All available laboratory and imaging results have been reviewed by myself prior to disposition.    MDM  After the initial H&P, I discussed pertinent information from history and physical exam with patient/family.  Discussed differential diagnosis.  Discussed plan for ED evaluation/work-up/treatment.  All questions answered.  Patient/family is agreeable with plan.  ED Course as of 01/09/22 1325   Sun Jan 09, 2022   1138 Patient presents with diarrhea, currently borderline hypotensive, receiving IV fluids via pressure bag.  Given severity of diarrhea, obtaining stool studies, checking lab work including magnesium.  Patient is likely dehydrated and will require admission.  Checking Covid testing.  Obtaining EKG chest x-ray troponin secondary to dizziness although dizziness likely secondary to orthostasis from dehydration. [JG]   1207 EKG independently viewed and contemporaneously interpreted by ED physician. Time: 12:04 PM.  Rate 76.  Interpretation: Normal sinus rhythm, normal axis, normal QRS, no acute ST changes. [JG]   1217 Patient has significant leukocytosis with white count over 20.  Given severity of leukocytosis, obtain blood cultures and lactic acid. [JG]   1218 Blood pressure improving, currently 97/54.  Patient has severe TRACY, creatinine 3.63, BUN 73.  Ordering additional IV fluids. [JG]   1226 Patient reassessed.  Discussed ED findings, differential diagnosis, and the need for admission for evaluation/treatment.  They are agreeable to admission and all questions were answered.     [JG]   1324 Phone call with Dr. Green  BARRY Mcgovern.  Discussed the patient, relevant history, exam, diagnostics, ED findings/progress, and concerns. They agree to admit the patient to telemetry. Care assumed by the admitting physician at this time.     [JG]      ED Course User Index  [JG] Richi Barbosa MD       AS OF 13:25 EST VITALS:    BP - 97/54  HR - 80  TEMP - 97.4 °F (36.3 °C) (Tympanic)  O2 SATS - 93%    DIAGNOSIS  Final diagnoses:   TRACY (acute kidney injury) (HCC)   Hyponatremia   Diarrhea, unspecified type   Leukocytosis, unspecified type   Transient hypotension         DISPOSITION  ADMISSION    Discussed treatment plan and reason for admission with pt/family and admitting physician.  Pt/family voiced understanding of the plan for admission for further testing/treatment as needed.          Richi Barbosa MD  01/09/22 5722

## 2022-01-09 NOTE — PROGRESS NOTES
Clinical Pharmacy Services: Medication History    Gardenia Roper is a 76 y.o. female presenting to [facility] for TRACY (acute kidney injury) (HCC) [N17.9]    She  has a past medical history of Hyperlipidemia, Hypertension, Kidney stone on right side, Osteoporosis, and Ovarian cyst.    Allergies as of 01/09/2022   • (No Known Allergies)       Medication information was obtained from: Patient, Medication Bottles  Pharmacy and Phone Number: [id number] - [facility] [address] [facility] [address] AT [facility] - [phone] [facility] [phone] FX        Prior to Admission Medications     Prescriptions Last Dose Informant Patient Reported? Taking?    atorvastatin (LIPITOR) 20 MG tablet 1/8/2022 Medication Bottle Yes Yes    Take 10 mg by mouth Daily.    cetirizine (zyrTEC) 10 MG tablet 1/8/2022 Self Yes Yes    Take 10 mg by mouth Daily.    cholecalciferol (VITAMIN D3) 25 MCG (1000 UT) tablet 1/8/2022 Self Yes Yes    Take 1,000 Units by mouth Daily.    Misc Natural Products (Osteo Bi-Flex Adv Triple St) tablet 1/8/2022 Self Yes Yes    Take 1 tablet by mouth Daily.    montelukast (SINGULAIR) 10 MG tablet 1/8/2022 Medication Bottle Yes Yes    Take 10 mg by mouth Every Night.    Multiple Vitamins-Minerals (CENTRUM ADULTS PO) 1/8/2022 Self Yes Yes    Take 1 tablet by mouth daily.    omeprazole (priLOSEC) 40 MG capsule 1/8/2022 Self Yes Yes    Take 40 mg by mouth Daily As Needed.    oxymetazoline (AFRIN) 0.05 % nasal spray Past Week Self Yes Yes    2 sprays into the nostril(s) as directed by provider 2 (Two) Times a Day.    raloxifene (EVISTA) 60 MG tablet 1/8/2022 Medication Bottle Yes Yes    Take 60 mg by mouth Daily.    Triamcinolone Acetonide (NASACORT) 55 MCG/ACT nasal inhaler Past Week Self Yes Yes    2 sprays into each nostril Daily.    valsartan-hydrochlorothiazide (DIOVAN-HCT) 80-12.5 MG per tablet 1/8/2022 Medication Bottle Yes Yes    Take 1 tablet by mouth Daily.     verapamil ER (VERELAN) 240 MG 24 hr capsule 1/8/2022 Medication Bottle Yes Yes    Take 240 mg by mouth Every Night.            Medication notes:     This medication list is complete to the best of my knowledge as of 1/9/2022    Please call if questions.    Jaydon Valiente [facility]  1/9/2022 15:15 EST

## 2022-01-09 NOTE — ED TRIAGE NOTES
Pt from home reports diarrhea, generalized weakness and confusion x 1 week. Baseline A&O x 4.     Pt arrives in triage with mask on. Triage staff wearing N95 masks and goggles.

## 2022-01-09 NOTE — PROGRESS NOTES
Pharmacy consult to dose Piperacillin/tazobactam (Zosyn)    Gardenia Roper is a 76 y.o. female 80.7 kg (178 lb).    Pharmacy consulted to dose per Dr Mcgovern  Indication: Intra-abdominal infection  Day: 1  Duration: 4 days    Results from last 7 days   Lab Units 01/09/22  1142   CREATININE mg/dL 3.63*     Estimated Creatinine Clearance: 13.1 mL/min (A) (by C-G formula based on SCr of 3.63 mg/dL (H)).  WBC   Date Value Ref Range Status   01/09/2022 22.73 (H) 3.40 - 10.80 10*3/mm3 Final   08/31/2021 7.6 3.4 - 10.8 x10E3/uL Final   02/12/2019 11.0 (H) 3.4 - 10.8 x10E3/uL Final   11/14/2017 9.79 4.50 - 10.70 10*3/mm3 Final   05/19/2017 8.29 4.50 - 10.70 10*3/mm3 Final   05/06/2016 8.54 4.50 - 10.70 10*3/mm3 Final   04/20/2016 9.11 4.50 - 10.70 10*3/mm3 Final     Temp Readings from Last 3 Encounters:   01/09/22 97.4 °F (36.3 °C) (Tympanic)   09/10/21 97.9 °F (36.6 °C) (Temporal)   08/31/21 97.3 °F (36.3 °C) (Tympanic)        Microbiology:  Microbiology Results (last 10 days)       Procedure Component Value - Date/Time    COVID PRE-OP / PRE-PROCEDURE SCREENING ORDER (NO ISOLATION) - Swab, Nasopharynx [[id number]]  (Normal) Collected: 01/09/22 1145    Lab Status: Final result Specimen: Swab from Nasopharynx Updated: 01/09/22 1223    Narrative:      The following orders were created for panel order COVID PRE-OP / PRE-PROCEDURE SCREENING ORDER (NO ISOLATION) - Swab, Nasopharynx.  Procedure                               Abnormality         Status                     ---------                               -----------         ------                     COVID-19, [facility] IN-HOUSE...[[id number]]  Normal              Final result                 Please view results for these tests on the individual orders.    COVID-19,[facility] IN-HOUSE CEPHEID/SHANNAN NP SWAB IN TRANSPORT MEDIA 8-12 HR TAT - Swab, Nasopharynx [[id number]]  (Normal) Collected: 01/09/22 1145    Lab Status: Final result Specimen: Swab from Nasopharynx Updated: 01/09/22 1223      COVID19 Not Detected    Narrative:      Fact sheet for providers: https://www.fda.gov/media/543739/download    Fact sheet for patients: https://www.fda.gov/media/490584/download    Test performed by PCR.            Assessment/Plan:  Will start Zosyn 3.375 gm gm IV q12 h infused over 4 hours. Will continue to follow for adjustments as needed if patient renal functioning improves.      Thank you,    Abdullahi Gandhi, PharmD  Pharmacy Resident  01/09/22 16:24 EST

## 2022-01-10 LAB
ANION GAP SERPL CALCULATED.3IONS-SCNC: 13.3 MMOL/L (ref 5–15)
BUN SERPL-MCNC: 58 MG/DL (ref 8–23)
BUN/CREAT SERPL: 29 (ref 7–25)
CALCIUM SPEC-SCNC: 9.2 MG/DL (ref 8.6–10.5)
CHLORIDE SERPL-SCNC: 98 MMOL/L (ref 98–107)
CO2 SERPL-SCNC: 22.7 MMOL/L (ref 22–29)
CREAT SERPL-MCNC: 2 MG/DL (ref 0.57–1)
DEPRECATED RDW RBC AUTO: 44.9 FL (ref 37–54)
ERYTHROCYTE [DISTWIDTH] IN BLOOD BY AUTOMATED COUNT: 13.3 % (ref 12.3–15.4)
GFR SERPL CREATININE-BSD FRML MDRD: 24 ML/MIN/1.73
GLUCOSE BLDC GLUCOMTR-MCNC: 100 MG/DL (ref 70–130)
GLUCOSE BLDC GLUCOMTR-MCNC: 67 MG/DL (ref 70–130)
GLUCOSE SERPL-MCNC: 63 MG/DL (ref 65–99)
HCT VFR BLD AUTO: 39.7 % (ref 34–46.6)
HGB BLD-MCNC: 13.3 G/DL (ref 12–15.9)
MAGNESIUM SERPL-MCNC: 2.7 MG/DL (ref 1.6–2.4)
MCH RBC QN AUTO: 30.5 PG (ref 26.6–33)
MCHC RBC AUTO-ENTMCNC: 33.5 G/DL (ref 31.5–35.7)
MCV RBC AUTO: 91.1 FL (ref 79–97)
PHOSPHATE SERPL-MCNC: 4.7 MG/DL (ref 2.5–4.5)
PLATELET # BLD AUTO: 198 10*3/MM3 (ref 140–450)
PMV BLD AUTO: 11.3 FL (ref 6–12)
POTASSIUM SERPL-SCNC: 3.2 MMOL/L (ref 3.5–5.2)
RBC # BLD AUTO: 4.36 10*6/MM3 (ref 3.77–5.28)
SODIUM SERPL-SCNC: 134 MMOL/L (ref 136–145)
WBC NRBC COR # BLD: 18.14 10*3/MM3 (ref 3.4–10.8)

## 2022-01-10 PROCEDURE — 85027 COMPLETE CBC AUTOMATED: CPT | Performed by: HOSPITALIST

## 2022-01-10 PROCEDURE — 84100 ASSAY OF PHOSPHORUS: CPT | Performed by: HOSPITALIST

## 2022-01-10 PROCEDURE — 83735 ASSAY OF MAGNESIUM: CPT | Performed by: HOSPITALIST

## 2022-01-10 PROCEDURE — 36415 COLL VENOUS BLD VENIPUNCTURE: CPT | Performed by: HOSPITALIST

## 2022-01-10 PROCEDURE — 82962 GLUCOSE BLOOD TEST: CPT

## 2022-01-10 PROCEDURE — 97162 PT EVAL MOD COMPLEX 30 MIN: CPT

## 2022-01-10 PROCEDURE — 97530 THERAPEUTIC ACTIVITIES: CPT

## 2022-01-10 PROCEDURE — 80048 BASIC METABOLIC PNL TOTAL CA: CPT | Performed by: HOSPITALIST

## 2022-01-10 RX ORDER — POTASSIUM CHLORIDE 750 MG/1
40 TABLET, FILM COATED, EXTENDED RELEASE ORAL DAILY
Status: DISCONTINUED | OUTPATIENT
Start: 2022-01-10 | End: 2022-01-14 | Stop reason: HOSPADM

## 2022-01-10 RX ORDER — MELATONIN
1000 DAILY
Status: DISCONTINUED | OUTPATIENT
Start: 2022-01-10 | End: 2022-01-14 | Stop reason: HOSPADM

## 2022-01-10 RX ORDER — POTASSIUM CHLORIDE 750 MG/1
40 TABLET, FILM COATED, EXTENDED RELEASE ORAL ONCE
Status: COMPLETED | OUTPATIENT
Start: 2022-01-10 | End: 2022-01-10

## 2022-01-10 RX ORDER — MONTELUKAST SODIUM 10 MG/1
10 TABLET ORAL NIGHTLY
Status: DISCONTINUED | OUTPATIENT
Start: 2022-01-10 | End: 2022-01-14 | Stop reason: HOSPADM

## 2022-01-10 RX ADMIN — VANCOMYCIN HYDROCHLORIDE 125 MG: 125 CAPSULE ORAL at 23:45

## 2022-01-10 RX ADMIN — VANCOMYCIN HYDROCHLORIDE 125 MG: 125 CAPSULE ORAL at 12:38

## 2022-01-10 RX ADMIN — VANCOMYCIN HYDROCHLORIDE 125 MG: 125 CAPSULE ORAL at 05:36

## 2022-01-10 RX ADMIN — SODIUM CHLORIDE, PRESERVATIVE FREE 10 ML: 5 INJECTION INTRAVENOUS at 21:55

## 2022-01-10 RX ADMIN — MONTELUKAST SODIUM 10 MG: 10 TABLET, FILM COATED ORAL at 21:54

## 2022-01-10 RX ADMIN — VANCOMYCIN HYDROCHLORIDE 125 MG: 125 CAPSULE ORAL at 01:11

## 2022-01-10 RX ADMIN — POTASSIUM CHLORIDE 40 MEQ: 750 TABLET, EXTENDED RELEASE ORAL at 21:54

## 2022-01-10 RX ADMIN — POTASSIUM CHLORIDE 40 MEQ: 750 TABLET, EXTENDED RELEASE ORAL at 12:38

## 2022-01-10 RX ADMIN — VANCOMYCIN HYDROCHLORIDE 125 MG: 125 CAPSULE ORAL at 17:23

## 2022-01-10 RX ADMIN — Medication 1000 UNITS: at 21:54

## 2022-01-10 RX ADMIN — SODIUM CHLORIDE, POTASSIUM CHLORIDE, SODIUM LACTATE AND CALCIUM CHLORIDE 100 ML/HR: 600; 310; 30; 20 INJECTION, SOLUTION INTRAVENOUS at 05:39

## 2022-01-10 RX ADMIN — SODIUM CHLORIDE, PRESERVATIVE FREE 10 ML: 5 INJECTION INTRAVENOUS at 09:44

## 2022-01-10 RX ADMIN — ACETAMINOPHEN 650 MG: 325 TABLET ORAL at 14:54

## 2022-01-10 NOTE — PLAN OF CARE
Goal Outcome Evaluation:           Progress: no change  Outcome Summary: A&Ox4. Pt on RA. LR @ 100 mL/hr. Multiple loose BM's this shift. PO vancomycin given. Barrier cream applied. Worked with PT. Bladder scan showed 189. Appetite fair. Clear liquid diet. VSS. Will continue to monitor.

## 2022-01-10 NOTE — THERAPY EVALUATION
Patient Name: Gardenia Roper  : 1945    MRN: [id number]                              Today's Date: 1/10/2022       Admit Date: 2022    Visit Dx:     ICD-10-CM ICD-9-CM   1. TRACY (acute kidney injury) (HCC)  N17.9 584.9   2. Hyponatremia  E87.1 276.1   3. Diarrhea, unspecified type  R19.7 787.91   4. Leukocytosis, unspecified type  D72.829 288.60   5. Transient hypotension  I95.9 796.3     Patient Active Problem List   Diagnosis   • Right ureteral stone   • Hypertension   • Hyperlipidemia   • Hypercalcemia   • Gastroesophageal reflux disease   • Atopic rhinitis   • Medicare annual wellness visit, subsequent   • Vitamin D deficiency   • TRACY (acute kidney injury) (HCC)   • Diarrhea of presumed infectious origin   • Suprapubic pain   • Leukocytosis   • Hyponatremia   • Acidosis   • Clostridium difficile infection     Past Medical History:   Diagnosis Date   • Hyperlipidemia    • Hypertension    • Kidney stone on right side    • Osteoporosis    • Ovarian cyst      Past Surgical History:   Procedure Laterality Date   • ARTERIAL ANEURYSM REPAIR     • BRAIN SURGERY     • COSMETIC SURGERY     • OVARIAN CYST DRAINAGE/EXCISION     • OVARIAN CYST REMOVAL Left    • SINUS SURGERY     • URETEROSCOPY LASER LITHOTRIPSY WITH STENT INSERTION Right 2016    Procedure: CYSTO, RIGHT URETEROSCOPY , RIGHT STENT PLACEMENT, BILATERAL RETROGRADES;  Surgeon: Yuri Singh MD;  Location: [facility];  Service:    • URETEROSCOPY LASER LITHOTRIPSY WITH STENT INSERTION Right 2016    Procedure: RIGHT URETEROSCOPY LASER LITHOTRIPSY WITH STONE BASKET & STENT REMOVAL;  Surgeon: Yuri Singh MD;  Location: [facility];  Service:       General Information     Row Name 01/10/22 1519          Physical Therapy Time and Intention    Document Type evaluation  -CB     Mode of Treatment individual therapy; physical therapy  -CB     Row Name 01/10/22 1519          General Information    Patient Profile Reviewed  yes  -CB     Prior Level of Function independent:; gait; transfer; bed mobility  -CB     Existing Precautions/Restrictions fall  -CB     Barriers to Rehab none identified  -CB     Row Name 01/10/22 1519          Living Environment    Lives With alone  -CB     Row Name 01/10/22 1519          Home Main Entrance    Number of Stairs, Main Entrance two  -CB     Stair Railings, Main Entrance railings safe and in good condition  -CB     Row Name 01/10/22 1519          Stairs Within Home, Primary    Number of Stairs, Within Home, Primary none  -CB     Row Name 01/10/22 1519          Cognition    Orientation Status (Cognition) oriented x 3  -CB     Row Name 01/10/22 1519          Safety Issues, Functional Mobility    Impairments Affecting Function (Mobility) endurance/activity tolerance; strength; pain  -CB     Comment, Safety Issues/Impairments (Mobility) gait belt and non skid socks  -CB           User Key  (r) = Recorded By, (t) = Taken By, (c) = Cosigned By    Initials Name Provider Type    CB Ailin Solis PT Physical Therapist               Mobility     Row Name 01/10/22 1521          Bed Mobility    Bed Mobility supine-sit; sit-supine  -CB     Supine-Sit [place] (Bed Mobility) minimum assist (75% patient effort); verbal cues  -CB     Sit-Supine [place] (Bed Mobility) contact guard; verbal cues  -CB     Assistive Device (Bed Mobility) bed rails; head of bed elevated  -CB     Row Name 01/10/22 1521          Bed-Chair Transfer    Bed-Chair [place] (Transfers) not tested  -CB     Row Name 01/10/22 1521          Sit-Stand Transfer    Sit-Stand [place] (Transfers) contact guard; verbal cues  -CB     Assistive Device (Sit-Stand Transfers) --  no AD  -CB     Row Name 01/10/22 1521          Gait/Stairs (Locomotion)    [place] Level (Gait) contact guard; verbal cues  -CB     Assistive Device (Gait) --  holding bedrails  -CB     Distance in Feet (Gait) 3 sidesteps to HOB  -CB     Deviations/Abnormal  Patterns (Gait) gait speed decreased; stride length decreased  -CB     Bilateral Gait Deviations forward flexed posture  -CB           User Key  (r) = Recorded By, (t) = Taken By, (c) = Cosigned By    Initials Name Provider Type    Ailin Marinelli PT Physical Therapist               Obj/Interventions     Row Name 01/10/22 1522          Range of Motion Comprehensive    General Range of Motion bilateral lower extremity ROM WFL  -CB     Row Name 01/10/22 1522          Strength Comprehensive (MMT)    General Manual Muscle Testing (MMT) Assessment lower extremity strength deficits identified  -CB     Comment, General Manual Muscle Testing (MMT) Assessment BLE grossly 3+/5  -CB     Row Name 01/10/22 1522          Motor Skills    Therapeutic Exercise --  supine DF/PF, SLR, HS x10; bridges x5  -CB     Row Name 01/10/22 1522          Balance    Balance Assessment sitting static balance; sitting dynamic balance; standing static balance; standing dynamic balance  -CB     Static Sitting Balance WFL; unsupported; sitting, edge of bed  -CB     Dynamic Sitting Balance mild impairment; unsupported; sitting, edge of bed  -CB     Static Standing Balance mild impairment; unsupported; standing  -CB     Dynamic Standing Balance mild impairment; standing; supported  -CB     Row Name 01/10/22 1522          Sensory Assessment (Somatosensory)    Sensory Assessment (Somatosensory) LE sensation intact  -CB           User Key  (r) = Recorded By, (t) = Taken By, (c) = Cosigned By    Initials Name Provider Type    Ailin Marinelli PT Physical Therapist               Goals/Plan     Row Name 01/10/22 1526          Bed Mobility Goal 1 (PT)    Activity/Assistive Device (Bed Mobility Goal 1, PT) bed mobility activities, all  -CB     [place] Level/Cues Needed (Bed Mobility Goal 1, PT) supervision required  -CB     Time Frame (Bed Mobility Goal 1, PT) long term goal (LTG); 1 week  -CB     Row Name 01/10/22 1526          Transfer Goal 1 (PT)     Activity/Assistive Device (Transfer Goal 1, PT) sit-to-stand/stand-to-sit; bed-to-chair/chair-to-bed  -CB     [place] Level/Cues Needed (Transfer Goal 1, PT) supervision required  -CB     Time Frame (Transfer Goal 1, PT) long term goal (LTG); 1 week  -CB     Row Name 01/10/22 1526          Gait Training Goal 1 (PT)    Activity/Assistive Device (Gait Training Goal 1, PT) gait (walking locomotion)  -CB     [place] Level (Gait Training Goal 1, PT) standby assist  -CB     Distance (Gait Training Goal 1, PT) 100ft  -CB     Time Frame (Gait Training Goal 1, PT) long term goal (LTG); 1 week  -CB           User Key  (r) = Recorded By, (t) = Taken By, (c) = Cosigned By    Initials Name Provider Type    Ailin Marinelli, PT Physical Therapist               Clinical Impression     Row Name 01/10/22 1523          Pain    Additional Documentation Pain Scale: Numbers Pre/Post-Treatment (Group)  -CB     Row Name 01/10/22 1523          Pain Scale: Numbers Pre/Post-Treatment    Pretreatment Pain Rating 4/10  -CB     Posttreatment Pain Rating 4/10  -CB     Pain Location - Orientation lower  -CB     Pain Location back  -CB     Pain Intervention(s) Repositioned; Rest; Ambulation/increased activity  -CB     Row Name 01/10/22 1523          Plan of Care Review    Plan of Care Reviewed With patient  -CB     Progress no change  -CB     Outcome Summary Patient is an 77 yo male who presented with diarrhea, weakness, and confusion. Pt admitted with cdiff and TRACY. She lives alone with 2 PATI home. She is ind at baseline without use of AD. She denies falls as well. She presents today with low back pain, decreased activity tolerance, weakness. She completed bed mobility with CGA-Poppy, STS with CGA, and 3 sidesteps to HOB requiring CGA. Pt will continue to benefit from skilled PT to increase level of independence with bed mobility, transfers, and gait. Anticipate home with 24/7 care and HHPT. Pt reports son should be able to stay with  her at [place].  -CB     Row Name 01/10/22 1523          Therapy Assessment/Plan (PT)    Patient/Family Therapy Goals Statement (PT) to go home  -CB     Rehab Potential (PT) good, to achieve stated therapy goals  -CB     Criteria for Skilled Interventions Met (PT) yes  -CB     Row Name 01/10/22 1523          Positioning and Restraints    Pre-Treatment Position in bed  -CB     Post Treatment Position bed  -CB     In Bed notified nsg; fowlers; call light within reach; encouraged to call for assist; exit alarm on; side rails up x2  -CB           User Key  (r) = Recorded By, (t) = Taken By, (c) = Cosigned By    Initials Name Provider Type    Ailin Marinelli, PT Physical Therapist               Outcome Measures     Row Name 01/10/22 1527          How much help from another person do you currently need...    Turning from your back to your side while in flat bed without using bedrails? 3  -CB     Moving from lying on back to sitting on the side of a flat bed without bedrails? 3  -CB     Moving to and from a bed to a chair (including a wheelchair)? 3  -CB     Standing up from a chair using your arms (e.g., wheelchair, bedside chair)? 3  -CB     Climbing 3-5 steps with a railing? 2  -CB     To walk in hospital room? 3  -CB     AM-PAC 6 Clicks Score (PT) 17  -CB     Row Name 01/10/22 1527          Functional Assessment    Outcome Measure Options AM-PAC 6 Clicks Basic Mobility (PT)  -CB           User Key  (r) = Recorded By, (t) = Taken By, (c) = Cosigned By    Initials Name Provider Type    Ailin Marinelli, PT Physical Therapist                             Physical Therapy Education                 Title: PT OT SLP Therapies (Done)     Topic: Physical Therapy (Done)     Point: Mobility training (Done)     Learning Progress Summary           Patient Acceptance, E,TB,D, VU,NR by CB at 1/10/2022 1527                   Point: Home exercise program (Done)     Learning Progress Summary           Patient Acceptance, E,TB,D, VU,NR by  CB at 1/10/2022 1527                   Point: Body mechanics (Done)     Learning Progress Summary           Patient Acceptance, E,TB,D, VU,NR by CB at 1/10/2022 1527                   Point: Precautions (Done)     Learning Progress Summary           Patient Acceptance, E,TB,D, VU,NR by CB at 1/10/2022 1527                               User Key     Initials Effective Dates Name Provider Type Discipline     10/22/21 -  Ailin Solis, PT Physical Therapist PT              PT Recommendation and Plan  Planned Therapy Interventions (PT): balance training, bed mobility training, gait training, home exercise program, patient/family education, strengthening, transfer training  Plan of Care Reviewed With: patient  Progress: no change  Outcome Summary: Patient is an 75 yo male who presented with diarrhea, weakness, and confusion. Pt admitted with cdiff and TRACY. She lives alone with 2 PATI home. She is ind at baseline without use of AD. She denies falls as well. She presents today with low back pain, decreased activity tolerance, weakness. She completed bed mobility with CGA-Poppy, STS with CGA, and 3 sidesteps to HOB requiring CGA. Pt will continue to benefit from skilled PT to increase level of independence with bed mobility, transfers, and gait. Anticipate home with 24/7 care and HHPT. Pt reports son should be able to stay with her at [place].     Time Calculation:    PT Charges     Row Name 01/10/22 1528             Time Calculation    Start Time 1439  -CB      Stop Time 1504  -CB      Time Calculation (min) 25 min  -CB      PT Received On 01/10/22  -CB      PT - Next Appointment 01/11/22  -CB      PT Goal Re-Cert Due Date 01/17/22  -CB              Time Calculation- PT    Total Timed Code Minutes- PT 15 minute(s)  -CB              Timed Charges    24817 - PT Therapeutic Activity Minutes 15  -CB              Total Minutes    Timed Charges Total Minutes 15  -CB       Total Minutes 15  -CB            User Key  (r) = Recorded By,  (t) = Taken By, (c) = Cosigned By    Initials Name Provider Type    CB Ailin Solis, PT Physical Therapist              Therapy Charges for Today     Code Description Service Date Service Provider Modifiers Qty    20128096218 HC PT THERAPEUTIC ACT EA 15 MIN 1/10/2022 Ailin Solis, PT GP 1    82923056201 HC PT EVAL MOD COMPLEXITY 2 1/10/2022 Ailin Solis, PT GP 1          PT G-Codes  Outcome Measure Options: AM-PAC 6 Clicks Basic Mobility (PT)  AM-PAC 6 Clicks Score (PT): 17    Ailin Solis, PT  1/10/2022

## 2022-01-10 NOTE — CASE MANAGEMENT/SOCIAL WORK
Discharge Planning Assessment  [facility]     Patient Name: Gardenia Roper  MRN: [id number]  Today's Date: 1/10/2022    Admit Date: 1/9/2022     Discharge Needs Assessment     Row Name 01/10/22 0944       Living Environment    Lives With alone    Current Living Arrangements home/apartment/condo    Primary Care Provided by self    Provides Primary Care For no one    Family Caregiver if Needed child(reagan), adult    Family Caregiver Names Son Brent [phone]    Quality of Family Relationships helpful    Able to Return to Prior Arrangements other (see comments)  Unsure - awaiting PT eval       Resource/Environmental Concerns    Resource/Environmental Concerns none       Transition Planning    Patient/Family Anticipates Transition to home    Patient/Family Anticipated Services at Transition other (see comments)  May consider HH - will follow progress    Transportation Anticipated family or friend will provide       Discharge Needs Assessment    Readmission Within the Last 30 Days no previous admission in last 30 days    Equipment Currently Used at Home cane, straight    Concerns to be Addressed denies needs/concerns at this time    Anticipated Changes Related to Illness none    Equipment Needed After Discharge none    Provided Post Acute Provider List? Yes    Post Acute Provider List Home Health    Delivered To Support Person    Support Person Josef Kennedy    Method of Delivery In person               Discharge Plan     Row Name 01/10/22 0953       Plan    Plan Return home - no PT eval yet    Patient/Family in Agreement with Plan yes    Plan Comments Spoke with josef Kennedy [phone] at bedside.  Patient woke up then went back to sleep.  Patient lives alone, is IADL, uses a cane, still drives, has never used HH or been to SNF.  PCP is Dr. Jim Hampton andf pharmacy is [facility] on [facility] [employer] [facility] [place].  Son states he can assist if needed.  No PT eval yet.  Given list of HH/SNF and given [facility].  CCP will  follow. BHumeniuk RN              Continued Care and Services - Admitted Since 1/9/2022    Coordination has not been started for this encounter.       Expected Discharge Date and Time     Expected Discharge Date Expected Discharge Time    Jan 14, 2022          Demographic Summary     Row Name 01/10/22 0943       General Information    Admission Type inpatient    Arrived From home    Referral Source admission list    Reason for Consult discharge planning    Preferred Language English     Used During This Interaction no               Functional Status     Row Name 01/10/22 0943       Functional Status    Usual Activity Tolerance moderate    Current Activity Tolerance fair       Functional Status, IADL    Medications independent    Meal Preparation independent    Housekeeping independent    Laundry independent    Shopping independent       Mental Status    General Appearance WDL WDL       Mental Status Summary    Recent Changes in Mental Status/Cognitive Functioning unable to assess  Sleeping - aroused and went back to sleep                      Becky S. Humeniuk, RN

## 2022-01-10 NOTE — PROGRESS NOTES
Name: Gardenia Roper ADMIT: 2022   : 1945  PCP: Jim Hampton Jr., MD    MRN: [id number] LOS: 1 days   AGE/SEX: 76 y.o. female  ROOM: [facility]     Subjective   Subjective   Still having some diarrhea. Says she doesn't feel well  No abdl pain    Review of Systems     Objective   Objective   Vital Signs  Temp:  [97.4 °F (36.3 °C)-97.6 °F (36.4 °C)] 97.4 °F (36.3 °C)  Heart Rate:  [85-98] 98  Resp:  [18] 18  BP: (81-99)/(50-62) 83/53  SpO2:  [91 %-96 %] 92 %  on   ;   Device (Oxygen Therapy): room air  Body mass index is 26.69 kg/m².  Physical Exam  Vitals and nursing note reviewed.   Constitutional:       Appearance: She is ill-appearing.   HENT:      Head: Normocephalic and atraumatic.   Eyes:      General: No scleral icterus.  Cardiovascular:      Rate and Rhythm: Normal rate and regular rhythm.      Heart sounds: No murmur heard.      Pulmonary:      Effort: No respiratory distress.      Breath sounds: Normal breath sounds.   Abdominal:      General: Bowel sounds are normal. There is no distension.      Palpations: Abdomen is soft.      Tenderness: There is no abdominal tenderness.   Musculoskeletal:      Right lower leg: No edema.      Left lower leg: No edema.   Skin:     General: Skin is warm and dry.      Coloration: Skin is not pale.   Neurological:      General: No focal deficit present.      Mental Status: She is alert. She is disoriented.      Motor: Weakness present.         Results Review     I reviewed the patient's new clinical results.  Results from last 7 days   Lab Units 01/10/22  0747 22  1142   WBC 10*3/mm3 18.14* 22.73*   HEMOGLOBIN g/dL 13.3 14.8   PLATELETS 10*3/mm3 198 233     Results from last 7 days   Lab Units 01/10/22  0747 22  1142   SODIUM mmol/L 134* 127*   POTASSIUM mmol/L 3.2* 3.6   CHLORIDE mmol/L 98 92*   CO2 mmol/L 22.7 19.7*   BUN mg/dL 58* 73*   CREATININE mg/dL 2.00* 3.63*   GLUCOSE mg/dL 63* 81   EGFR IF NONAFRICN AM mL/min/1.73 24* 12*     Results from  last 7 days   Lab Units 01/09/22  1142   ALBUMIN g/dL 3.00*   BILIRUBIN mg/dL 0.5   ALK PHOS U/L 83   AST (SGOT) U/L 33*   ALT (SGPT) U/L 31     Results from last 7 days   Lab Units 01/10/22  0747 01/09/22  1142   CALCIUM mg/dL 9.2 10.1   ALBUMIN g/dL  --  3.00*   MAGNESIUM mg/dL 2.7* 3.0*   PHOSPHORUS mg/dL 4.7*  --      Results from last 7 days   Lab Units 01/09/22  1246   LACTATE mmol/L 1.5     COVID19   Date Value Ref Range Status   01/09/2022 Not Detected Not Detected - Ref. Range Final     Glucose   Date/Time Value Ref Range Status   01/10/2022 1136 100 70 - 130 mg/dL Final     Comment:     Meter: [id number] : [id number] Gaudencio MATTA   01/10/2022 0954 67 (L) 70 - 130 mg/dL Final     Comment:     Meter: [id number] : [id number] Mauricio Morris RN       CT Abdomen Pelvis Without Contrast  Narrative: CT ABDOMEN PELVIS WO CONTRAST-     HISTORY:  Suprapubic abdominal pain.      TECHNIQUE:  CT images of the abdomen and pelvis were obtained without  intravenous contrast. Reformatted images were reviewed. Radiation dose  reduction techniques were utilized, including automated exposure control  and exposure modulation based on body size.     COMPARISON:  CT abdomen and pelvis without contrast 04/19/2016     FINDINGS CT ABDOMEN/PELVIS: Heart size is normal. There is no  pericardial effusion. There is calcific coronary artery atherosclerosis.  There is mild bibasilar atelectasis and/or scarring. Pleural spaces are  clear.  The liver is normal in size. Layering hyperattenuation within the  gallbladder lumen is suggestive of biliary sludge and/or stones. There  are no pancreatic calcifications. There are calcified splenic  granulomata. Adrenal glands within normal limits. There are incompletely  assessed bilateral renal lesions, some of which measure greater than  simple fluid density. There is a tiny nonobstructing superior right  renal stone. There is no hydronephrosis. There is extensive calcific  aortoiliac and  branch vessel atherosclerosis.  There is a small hiatal hernia. There is colonic diverticulosis. There  is relatively diffuse colonic wall thickening with moderate pericolonic  fat stranding. There is also wall thickening and fat stranding involving  the rectum. The appendix is present and normal in size. There is new  small volume fluid, which is likely reactive. No discrete or drainable  fluid collection is identified. The bladder is well distended and within  normal limits. The uterus is present. There is a 7.6 cm right adnexal  cystic lesion, which has increased in size 5.0 cm in 2016.  There is multilevel degenerative disc disease.     Limitations: Evaluation of the solid parenchymal organs and vasculature  is limited due to lack of intravenous contrast.        Impression:    1.  Colonic diverticulosis with relatively diffuse corresponding colonic  wall thickening and moderate pericolonic fat stranding, which raises  concern for a nonspecific pan-proctocolitis with multifocal  diverticulitis considered less likely given the extent of inflammation.  Correlate for possible clostridium difficile. Small volume free fluid is  likely reactive. No discrete or drainable fluid collection is  identified.  2.  Increased size of a 7.6 cm right adnexal cystic lesion, which is  incompletely assessed. Recommend further evaluation with pelvic  ultrasound.                 This report was finalized on 1/9/2022 6:07 PM by Dr. Marti Parra M.D.     XR Chest 1 View  ONE VIEW PORTABLE CHEST     HISTORY: Dizziness. Hypertension.     FINDINGS: The lungs are well-expanded and clear except for a calcified  granuloma at the left base. The heart is borderline enlarged and there  is no acute disease or change from 09/10/2021.     This report was finalized on 1/9/2022 1:45 PM by Dr. Kyle Montes M.D.       Scheduled Medications  sodium chloride, 10 mL, Intravenous, Q12H  vancomycin, 125 mg, Oral, Q6H    Infusions  lactated ringers, 100  mL/hr, Last Rate: 100 mL/hr (01/10/22 0539)    Diet  Diet Clear Liquid; Thin       Assessment/Plan     Active Hospital Problems    Diagnosis  POA   • TRACY (acute kidney injury) (HCC) [N17.9]  Yes   • Diarrhea of presumed infectious origin [R19.7]  Yes   • Suprapubic pain [R10.2]  Unknown   • Leukocytosis [D72.829]  Unknown   • Hyponatremia [E87.1]  Unknown   • Acidosis [E87.2]  Unknown   • Clostridium difficile infection [A49.8]  Unknown      Resolved Hospital Problems   No resolved problems to display.       76 y.o. female admitted with diarrhea secondsary to C diff colitis and TRACY.    C diff colitis  -We will continue p.o. vancomycin  - Continue IV fluids    TRACY-improving greatly  -Continue fluids and recheck in a.m.  -Bladder scan does not show any obstruction    Borderline hypotension-obviously continue to hold home medications    We will replace potassium.        SCDs  Will d/w family  Dispo TBD      Pablito Perez MD  [facility]  01/10/22  18:40 EST     16

## 2022-01-10 NOTE — PLAN OF CARE
Goal Outcome Evaluation:  Plan of Care Reviewed With: patient        Progress: no change  Outcome Summary: Patient is an 77 yo male who presented with diarrhea, weakness, and confusion. Pt admitted with cdiff and TRACY. She lives alone with 2 PATI home. She is ind at baseline without use of AD. She denies falls as well. She presents today with low back pain, decreased activity tolerance, weakness. She completed bed mobility with CGA-Poppy, STS with CGA, and 3 sidesteps to HOB requiring CGA. Pt will continue to benefit from skilled PT to increase level of independence with bed mobility, transfers, and gait. Anticipate home with 24/7 care and HHPT. Pt reports son should be able to stay with her at [place].    Patient was not wearing a face mask during this therapy encounter. Therapist used appropriate personal protective equipment including gown, eye protection, mask and gloves.  Mask used was standard procedure mask. Appropriate PPE was worn during the entire therapy session. Hand hygiene was completed before and after therapy session. Patient is not in enhanced droplet precautions.

## 2022-01-10 NOTE — PLAN OF CARE
Goal Outcome Evaluation:               No complaints overnight. PO vanc given per order. Incontinence care performed. Patient having frequent loose green mucoid stools. Bottom red blanchable and excoriated. Barrier cream applied. BP stable. VSS. Will continue to monitor.

## 2022-01-11 LAB
ANION GAP SERPL CALCULATED.3IONS-SCNC: 7 MMOL/L (ref 5–15)
BUN SERPL-MCNC: 39 MG/DL (ref 8–23)
BUN/CREAT SERPL: 34.8 (ref 7–25)
CALCIUM SPEC-SCNC: 9.2 MG/DL (ref 8.6–10.5)
CHLORIDE SERPL-SCNC: 103 MMOL/L (ref 98–107)
CO2 SERPL-SCNC: 25 MMOL/L (ref 22–29)
CREAT SERPL-MCNC: 1.12 MG/DL (ref 0.57–1)
DEPRECATED RDW RBC AUTO: 42 FL (ref 37–54)
ERYTHROCYTE [DISTWIDTH] IN BLOOD BY AUTOMATED COUNT: 12.9 % (ref 12.3–15.4)
GFR SERPL CREATININE-BSD FRML MDRD: 47 ML/MIN/1.73
GLUCOSE SERPL-MCNC: 82 MG/DL (ref 65–99)
HCT VFR BLD AUTO: 40.1 % (ref 34–46.6)
HGB BLD-MCNC: 13.4 G/DL (ref 12–15.9)
MCH RBC QN AUTO: 30.2 PG (ref 26.6–33)
MCHC RBC AUTO-ENTMCNC: 33.4 G/DL (ref 31.5–35.7)
MCV RBC AUTO: 90.5 FL (ref 79–97)
PLATELET # BLD AUTO: 192 10*3/MM3 (ref 140–450)
PMV BLD AUTO: 11 FL (ref 6–12)
POTASSIUM SERPL-SCNC: 3.5 MMOL/L (ref 3.5–5.2)
RBC # BLD AUTO: 4.43 10*6/MM3 (ref 3.77–5.28)
SODIUM SERPL-SCNC: 135 MMOL/L (ref 136–145)
WBC NRBC COR # BLD: 22.58 10*3/MM3 (ref 3.4–10.8)

## 2022-01-11 PROCEDURE — 97166 OT EVAL MOD COMPLEX 45 MIN: CPT

## 2022-01-11 PROCEDURE — 80048 BASIC METABOLIC PNL TOTAL CA: CPT | Performed by: HOSPITALIST

## 2022-01-11 PROCEDURE — 97530 THERAPEUTIC ACTIVITIES: CPT

## 2022-01-11 PROCEDURE — 85027 COMPLETE CBC AUTOMATED: CPT | Performed by: HOSPITALIST

## 2022-01-11 PROCEDURE — 97110 THERAPEUTIC EXERCISES: CPT

## 2022-01-11 RX ADMIN — SODIUM CHLORIDE, PRESERVATIVE FREE 10 ML: 5 INJECTION INTRAVENOUS at 22:53

## 2022-01-11 RX ADMIN — VANCOMYCIN HYDROCHLORIDE 125 MG: 125 CAPSULE ORAL at 23:00

## 2022-01-11 RX ADMIN — POTASSIUM CHLORIDE 40 MEQ: 750 TABLET, EXTENDED RELEASE ORAL at 10:04

## 2022-01-11 RX ADMIN — SODIUM CHLORIDE, PRESERVATIVE FREE 10 ML: 5 INJECTION INTRAVENOUS at 10:05

## 2022-01-11 RX ADMIN — SODIUM CHLORIDE, POTASSIUM CHLORIDE, SODIUM LACTATE AND CALCIUM CHLORIDE 100 ML/HR: 600; 310; 30; 20 INJECTION, SOLUTION INTRAVENOUS at 18:24

## 2022-01-11 RX ADMIN — SODIUM CHLORIDE, POTASSIUM CHLORIDE, SODIUM LACTATE AND CALCIUM CHLORIDE 100 ML/HR: 600; 310; 30; 20 INJECTION, SOLUTION INTRAVENOUS at 02:32

## 2022-01-11 RX ADMIN — MONTELUKAST SODIUM 10 MG: 10 TABLET, FILM COATED ORAL at 22:53

## 2022-01-11 RX ADMIN — VANCOMYCIN HYDROCHLORIDE 125 MG: 125 CAPSULE ORAL at 06:08

## 2022-01-11 RX ADMIN — VANCOMYCIN HYDROCHLORIDE 125 MG: 125 CAPSULE ORAL at 18:24

## 2022-01-11 RX ADMIN — ZINC OXIDE 1 APPLICATION: 200 OINTMENT TOPICAL at 18:33

## 2022-01-11 RX ADMIN — VANCOMYCIN HYDROCHLORIDE 125 MG: 125 CAPSULE ORAL at 13:06

## 2022-01-11 NOTE — PLAN OF CARE
Goal Outcome Evaluation:  Plan of Care Reviewed With: patient  Progress: no change      On continuous fluid. On oral vanco for c.diff. VSS. Incont care done.

## 2022-01-11 NOTE — THERAPY EVALUATION
Patient Name: Gardenia Roper  : 1945    MRN: [id number]                              Today's Date: 2022       Admit Date: 2022    Visit Dx:     ICD-10-CM ICD-9-CM   1. TRACY (acute kidney injury) (HCC)  N17.9 584.9   2. Hyponatremia  E87.1 276.1   3. Diarrhea, unspecified type  R19.7 787.91   4. Leukocytosis, unspecified type  D72.829 288.60   5. Transient hypotension  I95.9 796.3     Patient Active Problem List   Diagnosis   • Right ureteral stone   • Hypertension   • Hyperlipidemia   • Hypercalcemia   • Gastroesophageal reflux disease   • Atopic rhinitis   • Medicare annual wellness visit, subsequent   • Vitamin D deficiency   • TRACY (acute kidney injury) (HCC)   • Diarrhea of presumed infectious origin   • Suprapubic pain   • Leukocytosis   • Hyponatremia   • Acidosis   • Clostridium difficile infection     Past Medical History:   Diagnosis Date   • Hyperlipidemia    • Hypertension    • Kidney stone on right side    • Osteoporosis    • Ovarian cyst      Past Surgical History:   Procedure Laterality Date   • ARTERIAL ANEURYSM REPAIR     • BRAIN SURGERY     • COSMETIC SURGERY     • OVARIAN CYST DRAINAGE/EXCISION     • OVARIAN CYST REMOVAL Left    • SINUS SURGERY     • URETEROSCOPY LASER LITHOTRIPSY WITH STENT INSERTION Right 2016    Procedure: CYSTO, RIGHT URETEROSCOPY , RIGHT STENT PLACEMENT, BILATERAL RETROGRADES;  Surgeon: Yuri Singh MD;  Location: [facility];  Service:    • URETEROSCOPY LASER LITHOTRIPSY WITH STENT INSERTION Right 2016    Procedure: RIGHT URETEROSCOPY LASER LITHOTRIPSY WITH STONE BASKET & STENT REMOVAL;  Surgeon: Yuri Singh MD;  Location: [facility];  Service:       General Information     Row Name 22 0834          OT Time and Intention    Document Type evaluation  -BL     Mode of Treatment individual therapy; occupational therapy  -BL     Row Name 22 0834          General Information    Patient Profile Reviewed yes  -BL      Existing Precautions/Restrictions fall  -     Barriers to Rehab none identified  -     Row Name 01/11/22 0834          Occupational Profile    Reason for Services/Referral (Occupational Profile) Pt is a 77 y/o female admit to [facility] for Diarrhea, AMS, and weakness. Pt reports that she lives at home alone and is independent with all ADLs/IADLs at baseline. Pt reports that she does have cane but does not really use it for functional mobility. Pt reports that she enjoys puzzles.  -     Row Name 01/11/22 0834          Living Environment    Lives With alone  -     Row Name 01/11/22 0834          Home Main Entrance    Number of Stairs, Main Entrance one  -     Row Name 01/11/22 0834          Stairs Within Home, Primary    Number of Stairs, Within Home, Primary none  -     Row Name 01/11/22 0834          Cognition    Orientation Status (Cognition) oriented x 3  -     Row Name 01/11/22 0834          Safety Issues, Functional Mobility    Safety Issues Affecting Function (Mobility) insight into deficits/self-awareness; awareness of need for assistance; judgment; safety precaution awareness  -     Impairments Affecting Function (Mobility) balance; endurance/activity tolerance; strength; coordination  -           User Key  (r) = Recorded By, (t) = Taken By, (c) = Cosigned By    Initials Name Provider Type     Denis Cazares OT Occupational Therapist                 Mobility/ADL's     Row Name 01/11/22 0907 01/11/22 0837       Bed Mobility    Bed Mobility rolling left; rolling right  -BL supine-sit  -BL    Rolling Left [place] (Bed Mobility) minimum assist (75% patient effort); verbal cues  - --    Rolling Right [place] (Bed Mobility) minimum assist (75% patient effort); verbal cues  -BL --    Supine-Sit [place] (Bed Mobility) -- supervision  -    Sit-Supine [place] (Bed Mobility) -- not tested  -    Assistive Device (Bed Mobility) -- bed rails  -    Row Name 01/11/22 0907 01/11/22  0837       Transfers    Transfers -- sit-stand transfer; bed-chair transfer  -    Bed-Chair [place] (Transfers) -- minimum assist (75% patient effort); verbal cues  -BL    Assistive Device (Bed-Chair Transfers) -- --  HHA x1  -BL    Sit-Stand [place] (Transfers) contact guard; verbal cues  -BL --    Row Name 01/11/22 0907          Sit-Stand Transfer    Assistive Device (Sit-Stand Transfers) --  HHA x1  -BL     Row Name 01/11/22 0907          Activities of Daily Living    BADL Assessment/Intervention lower body dressing; grooming; toileting  -     Row Name 01/11/22 0907          Lower Body Dressing Assessment/Training    [place] Level (Lower Body Dressing) doff; don; socks; supervision; verbal cues  -BL     Position (Lower Body Dressing) edge of bed sitting  -     Row Name 01/11/22 0907          Grooming Assessment/Training    [place] Level (Grooming) wash face, hands; set up; verbal cues  -     Position (Grooming) supported sitting  -[facility] Name 01/11/22 0907          Toileting Assessment/Training    [place] Level (Toileting) change pad/brief; adjust/manage clothing; perform perineal hygiene; dependent (less than 25% patient effort); verbal cues  -BL     Position (Toileting) supine  -           User Key  (r) = Recorded By, (t) = Taken By, (c) = Cosigned By    Initials Name Provider Type     Denis Cazares OT Occupational Therapist               Obj/Interventions     Row Name 01/11/22 0910          Sensory Assessment (Somatosensory)    Sensory Assessment (Somatosensory) UE sensation intact  -BL     Row Name 01/11/22 0910          Vision Assessment/Intervention    Visual Impairment/Limitations corrective lenses full-time  -[facility] Name 01/11/22 0910          Range of Motion Comprehensive    General Range of Motion no range of motion deficits identified  -[facility] Name 01/11/22 0910          Strength Comprehensive (MMT)    Comment, General Manual Muscle Testing (MMT)  Assessment BUE 3-/5  -BL     Row Name 01/11/22 0910          Motor Skills    Motor Skills coordination  -BL     Coordination bilateral; upper extremity; minimal impairment; dysdiadochokinesia; dysmetria  -     Row Name 01/11/22 0910          Balance    Balance Assessment sitting static balance; sitting dynamic balance; sit to stand dynamic balance; standing static balance; standing dynamic balance  -BL     Static Sitting Balance WFL; unsupported; sitting, edge of bed  -BL     Dynamic Sitting Balance WFL; unsupported; sitting, edge of bed  -BL     Sit to Stand Dynamic Balance mild impairment; supported; standing  -BL     Static Standing Balance mild impairment; supported; standing  -BL     Dynamic Standing Balance mild impairment; supported; standing  -BL     Balance Interventions sitting; standing; sit to stand; supported; dynamic; static; occupation based/functional task  -BL           User Key  (r) = Recorded By, (t) = Taken By, (c) = Cosigned By    Initials Name Provider Type    BL Denis Cazares, RAFA Occupational Therapist               Goals/Plan     Row Name 01/11/22 0918          Bed Mobility Goal 1 (OT)    Activity/Assistive Device (Bed Mobility Goal 1, OT) bed mobility activities, all  -BL     [place] Level/Cues Needed (Bed Mobility Goal 1, OT) modified independence  -BL     Time Frame (Bed Mobility Goal 1, OT) short term goal (STG); 2 weeks  -BL     Progress/Outcomes (Bed Mobility Goal 1, OT) continuing progress toward goal  -BL     Row Name 01/11/22 0918          Transfer Goal 1 (OT)    Activity/Assistive Device (Transfer Goal 1, OT) transfers, all  -BL     [place] Level/Cues Needed (Transfer Goal 1, OT) supervision required  -BL     Time Frame (Transfer Goal 1, OT) short term goal (STG); 2 weeks  -BL     Progress/Outcome (Transfer Goal 1, OT) continuing progress toward goal  -BL     Row Name 01/11/22 0918          Dressing Goal 1 (OT)    Activity/Device (Dressing Goal 1, OT) dressing skills,  all  -BL     [place]/Cues Needed (Dressing Goal 1, OT) set-up required  -BL     Time Frame (Dressing Goal 1, OT) short term goal (STG); 2 weeks  -BL     Progress/Outcome (Dressing Goal 1, OT) continuing progress toward goal  -BL     Row Name 01/11/22 0918          Toileting Goal 1 (OT)    Activity/Device (Toileting Goal 1, OT) toileting skills, all  -BL     [place] Level/Cues Needed (Toileting Goal 1, OT) contact guard assist  -BL     Time Frame (Toileting Goal 1, OT) short term goal (STG); 2 weeks  -BL     Progress/Outcome (Toileting Goal 1, OT) continuing progress toward goal  -BL     Row Name 01/11/22 0918          Strength Goal 1 (OT)    Strength Goal 1 (OT) Pt will be independent with HEP prior to D/C.  -BL     Time Frame (Strength Goal 1, OT) short term goal (STG); 2 weeks  -BL     Progress/Outcome (Strength Goal 1, OT) continuing progress toward goal  -BL     Row Name 01/11/22 0918          Therapy Assessment/Plan (OT)    Planned Therapy Interventions (OT) activity tolerance training; BADL retraining; IADL retraining; occupation/activity based interventions; transfer/mobility retraining; strengthening exercise; patient/caregiver education/training  -BL           User Key  (r) = Recorded By, (t) = Taken By, (c) = Cosigned By    Initials Name Provider Type    BL Denis Cazares, RAFA Occupational Therapist               Clinical Impression     Row Name 01/11/22 0912          Pain Assessment    Additional Documentation Pain Scale: Numbers Pre/Post-Treatment (Group)  -[facility] Name 01/11/22 0912          Pain Scale: Numbers Pre/Post-Treatment    Pretreatment Pain Rating 0/10 - no pain  -BL     Posttreatment Pain Rating 0/10 - no pain  -     Row Name 01/11/22 0912          Plan of Care Review    Plan of Care Reviewed With patient  -BL     Progress improving  -BL     Outcome Summary Pt seen by OT this date for evaluation. Pt is a 75 y/o female admit to [facility] for weakness, diarrhea, and AMS. Pt reports  that she lives at home alone and is independent with all ADLs/IADLs at baseline and that she does have a cane but does not really use it for functional mobility. Upon arrival pt lying supine in bed, aide present for clean up after BM, pt A&O x3. Pt able to roll in bed left/right with Min A and verbal cues for assistance with clean up. Pt dependent for brief management and pericare this date in supine. Pt performed sup>sit transition with Supervision to sit EOB. Pt able to doff/don socks sitting EOB with Supervision. Pt performed sit>stand transition with CGA and Graciela for transfer from bed>chair due to slight unsteadiness in standing. Once in chair pt completed grooming task sitting in chair with S/U and verbal cues. Pt left sitting in  chair, needs in reach, alarm on. Pt to benefit from skilled OT services to address goals and deficits. OT wore mask, gloves, glasses, hand hygiene performed, appropriate ppe worn during encounter.  -     Row Name 01/11/22 0912          Therapy Assessment/Plan (OT)    Rehab Potential (OT) good, to achieve stated therapy goals  -     Criteria for Skilled Therapeutic Interventions Met (OT) skilled treatment is necessary  -     Therapy Frequency (OT) 3 times/wk  -     Row Name 01/11/22 0912          Therapy Plan Review/Discharge Plan (OT)    Equipment Needs Upon Discharge (OT) shower chair  -     Row Name 01/11/22 0912          Vital Signs    Pre Patient Position Supine  -BL     Intra Patient Position Standing  -BL     Post Patient Position Sitting  -     Row Name 01/11/22 0912          Positioning and Restraints    Pre-Treatment Position in bed  -BL     Post Treatment Position chair  -BL     In Chair sitting; call light within reach; encouraged to call for assist; exit alarm on; with family/caregiver  -           User Key  (r) = Recorded By, (t) = Taken By, (c) = Cosigned By    Initials Name Provider Type    Denis Nix, OT Occupational Therapist                Outcome Measures     Row Name 01/11/22 0919          How much help from another is currently needed...    Putting on and taking off regular lower body clothing? 3  -BL     Bathing (including washing, rinsing, and drying) 3  -BL     Toileting (which includes using toilet bed pan or urinal) 2  -BL     Putting on and taking off regular upper body clothing 3  -BL     Taking care of personal grooming (such as brushing teeth) 3  -BL     Eating meals 3  -BL     AM-PAC 6 Clicks Score (OT) 17  -BL     Row Name 01/11/22 0919          Modified Weston Scale    Modified Weston Scale 4 - Moderately severe disability.  Unable to walk without assistance, and unable to attend to own bodily needs without assistance.  -     Row Name 01/11/22 0919          Functional Assessment    Outcome Measure Options AM-PAC 6 Clicks Daily Activity (OT); Modified [place]  -           User Key  (r) = Recorded By, (t) = Taken By, (c) = Cosigned By    Initials Name Provider Type    Denis Nix OT Occupational Therapist                Occupational Therapy Education                 Title: PT OT SLP Therapies (In Progress)     Topic: Occupational Therapy (In Progress)     Point: ADL training (Done)     Description:   Instruct learner(s) on proper safety adaptation and remediation techniques during self care or transfers.   Instruct in proper use of assistive devices.              Learning Progress Summary           Patient ANGELES Forte VU by  at 1/11/2022 0920    Comment: The role of OT                   Point: Home exercise program (Not Started)     Description:   Instruct learner(s) on appropriate technique for monitoring, assisting and/or progressing therapeutic exercises/activities.              Learner Progress:  Not documented in this visit.          Point: Precautions (Not Started)     Description:   Instruct learner(s) on prescribed precautions during self-care and functional transfers.              Learner Progress:  Not  documented in this visit.          Point: Body mechanics (Not Started)     Description:   Instruct learner(s) on proper positioning and spine alignment during self-care, functional mobility activities and/or exercises.              Learner Progress:  Not documented in this visit.                      User Key     Initials Effective Dates Name Provider Type Discipline     01/05/21 -  Denis Cazares, RAFA Occupational Therapist OT              OT Recommendation and Plan  Planned Therapy Interventions (OT): activity tolerance training, BADL retraining, IADL retraining, occupation/activity based interventions, transfer/mobility retraining, strengthening exercise, patient/caregiver education/training  Therapy Frequency (OT): 3 times/wk  Plan of Care Review  Plan of Care Reviewed With: patient  Progress: improving  Outcome Summary: Pt seen by OT this date for evaluation. Pt is a 77 y/o female admit to [facility] for weakness, diarrhea, and AMS. Pt reports that she lives at home alone and is independent with all ADLs/IADLs at baseline and that she does have a cane but does not really use it for functional mobility. Upon arrival pt lying supine in bed, aide present for clean up after BM, pt A&O x3. Pt able to roll in bed left/right with Min A and verbal cues for assistance with clean up. Pt dependent for brief management and pericare this date in supine. Pt performed sup>sit transition with Supervision to sit EOB. Pt able to doff/don socks sitting EOB with Supervision. Pt performed sit>stand transition with CGA and Graciela for transfer from bed>chair due to slight unsteadiness in standing. Once in chair pt completed grooming task sitting in chair with S/U and verbal cues. Pt left sitting in  chair, needs in reach, alarm on. Pt to benefit from skilled OT services to address goals and deficits. OT wore mask, gloves, glasses, hand hygiene performed, appropriate ppe worn during encounter.     Time Calculation:    Time Calculation- OT      Row Name 01/11/22 0921             Time Calculation- OT    OT Start Time 0812  -BL      OT Stop Time 0832  -BL      OT Time Calculation (min) 20 min  -BL      Total Timed Code Minutes- OT 15 minute(s)  -BL      OT Received On 01/11/22  -BL      OT - Next Appointment 01/12/22  -BL      OT Goal Re-Cert Due Date 01/25/22  -BL              Timed Charges    61650 - OT Therapeutic Activity Minutes 15  -BL              Untimed Charges    OT Eval/Re-eval Minutes 5  -BL              Total Minutes    Timed Charges Total Minutes 15  -BL      Untimed Charges Total Minutes 5  -BL       Total Minutes 20  -BL            User Key  (r) = Recorded By, (t) = Taken By, (c) = Cosigned By    Initials Name Provider Type     Denis Cazares OT Occupational Therapist              Therapy Charges for Today     Code Description Service Date Service Provider Modifiers Qty    72860974385 HC OT THERAPEUTIC ACT EA 15 MIN 1/11/2022 Denis Cazares OT GO 1    68910377827 HC OT EVAL MOD COMPLEXITY 2 1/11/2022 Denis Cazares OT GO 1               Denis Cazares OT  1/11/2022

## 2022-01-11 NOTE — PLAN OF CARE
Goal Outcome Evaluation:  Plan of Care Reviewed With: patient        Progress: improving  Outcome Summary: Pt seen by OT this date for evaluation. Pt is a 77 y/o female admit to [facility] for weakness, diarrhea, and AMS. Pt reports that she lives at home alone and is independent with all ADLs/IADLs at baseline and that she does have a cane but does not really use it for functional mobility. Upon arrival pt lying supine in bed, aide present for clean up after BM, pt A&O x3. Pt able to roll in bed left/right with Min A and verbal cues for assistance with clean up. Pt dependent for brief management and pericare this date in supine. Pt performed sup>sit transition with Supervision to sit EOB. Pt able to doff/don socks sitting EOB with Supervision. Pt performed sit>stand transition with CGA and Graciela for transfer from bed>chair due to slight unsteadiness in standing. Once in chair pt completed grooming task sitting in chair with S/U and verbal cues. Pt left sitting in  chair, needs in reach, alarm on. Pt to benefit from skilled OT services to address goals and deficits. OT wore mask, gloves, glasses, hand hygiene performed, appropriate ppe worn during encounter.

## 2022-01-11 NOTE — PROGRESS NOTES
Name: Gardenia Roper ADMIT: 2022   : 1945  PCP: Jim Hampton Jr., MD    MRN: [id number] LOS: 2 days   AGE/SEX: 76 y.o. female  ROOM: [facility]     Subjective   Subjective   Still having diarrhea. Looks a little better  No abdl pain    Review of Systems     Objective   Objective   Vital Signs  Temp:  [96.6 °F (35.9 °C)-97.5 °F (36.4 °C)] 96.9 °F (36.1 °C)  Heart Rate:  [] 102  Resp:  [16] 16  BP: (100-106)/(51-63) 104/63  SpO2:  [90 %-93 %] 93 %  on   ;   Device (Oxygen Therapy): room air  Body mass index is 27.86 kg/m².  Physical Exam  Vitals and nursing note reviewed.   Constitutional:       Appearance: She is ill-appearing.   HENT:      Head: Normocephalic and atraumatic.   Eyes:      General: No scleral icterus.  Cardiovascular:      Rate and Rhythm: Normal rate and regular rhythm.      Heart sounds: No murmur heard.      Pulmonary:      Effort: No respiratory distress.      Breath sounds: Normal breath sounds.   Abdominal:      General: Bowel sounds are normal. There is no distension.      Palpations: Abdomen is soft.      Tenderness: There is no abdominal tenderness.   Musculoskeletal:      Right lower leg: No edema.      Left lower leg: No edema.   Skin:     General: Skin is warm and dry.      Coloration: Skin is not pale.   Neurological:      General: No focal deficit present.      Mental Status: She is alert and oriented to person, place, and time.      Motor: Weakness present.         Results Review     I reviewed the patient's new clinical results.  Results from last 7 days   Lab Units 22  0541 01/10/22  0747 22  1142   WBC 10*3/mm3 22.58* 18.14* 22.73*   HEMOGLOBIN g/dL 13.4 13.3 14.8   PLATELETS 10*3/mm3 192 198 233     Results from last 7 days   Lab Units 22  0541 01/10/22  0747 22  1142   SODIUM mmol/L 135* 134* 127*   POTASSIUM mmol/L 3.5 3.2* 3.6   CHLORIDE mmol/L 103 98 92*   CO2 mmol/L 25.0 22.7 19.7*   BUN mg/dL 39* 58* 73*   CREATININE mg/dL 1.12* 2.00*  3.63*   GLUCOSE mg/dL 82 63* 81   EGFR IF NONAFRICN AM mL/min/1.73 47* 24* 12*     Results from last 7 days   Lab Units 01/09/22  1142   ALBUMIN g/dL 3.00*   BILIRUBIN mg/dL 0.5   ALK PHOS U/L 83   AST (SGOT) U/L 33*   ALT (SGPT) U/L 31     Results from last 7 days   Lab Units 01/11/22  0541 01/10/22  0747 01/09/22  1142   CALCIUM mg/dL 9.2 9.2 10.1   ALBUMIN g/dL  --   --  3.00*   MAGNESIUM mg/dL  --  2.7* 3.0*   PHOSPHORUS mg/dL  --  4.7*  --      Results from last 7 days   Lab Units 01/09/22  1246   LACTATE mmol/L 1.5     COVID19   Date Value Ref Range Status   01/09/2022 Not Detected Not Detected - Ref. Range Final     Glucose   Date/Time Value Ref Range Status   01/10/2022 1136 100 70 - 130 mg/dL Final     Comment:     Meter: [id number] : [id number] Gaudencio Yoder SIGRID   01/10/2022 0954 67 (L) 70 - 130 mg/dL Final     Comment:     Meter: [id number] : [id number] Mauricio Morris RN       CT Abdomen Pelvis Without Contrast  Narrative: CT ABDOMEN PELVIS WO CONTRAST-     HISTORY:  Suprapubic abdominal pain.      TECHNIQUE:  CT images of the abdomen and pelvis were obtained without  intravenous contrast. Reformatted images were reviewed. Radiation dose  reduction techniques were utilized, including automated exposure control  and exposure modulation based on body size.     COMPARISON:  CT abdomen and pelvis without contrast 04/19/2016     FINDINGS CT ABDOMEN/PELVIS: Heart size is normal. There is no  pericardial effusion. There is calcific coronary artery atherosclerosis.  There is mild bibasilar atelectasis and/or scarring. Pleural spaces are  clear.  The liver is normal in size. Layering hyperattenuation within the  gallbladder lumen is suggestive of biliary sludge and/or stones. There  are no pancreatic calcifications. There are calcified splenic  granulomata. Adrenal glands within normal limits. There are incompletely  assessed bilateral renal lesions, some of which measure greater than  simple fluid density. There  is a tiny nonobstructing superior right  renal stone. There is no hydronephrosis. There is extensive calcific  aortoiliac and branch vessel atherosclerosis.  There is a small hiatal hernia. There is colonic diverticulosis. There  is relatively diffuse colonic wall thickening with moderate pericolonic  fat stranding. There is also wall thickening and fat stranding involving  the rectum. The appendix is present and normal in size. There is new  small volume fluid, which is likely reactive. No discrete or drainable  fluid collection is identified. The bladder is well distended and within  normal limits. The uterus is present. There is a 7.6 cm right adnexal  cystic lesion, which has increased in size 5.0 cm in 2016.  There is multilevel degenerative disc disease.     Limitations: Evaluation of the solid parenchymal organs and vasculature  is limited due to lack of intravenous contrast.        Impression:    1.  Colonic diverticulosis with relatively diffuse corresponding colonic  wall thickening and moderate pericolonic fat stranding, which raises  concern for a nonspecific pan-proctocolitis with multifocal  diverticulitis considered less likely given the extent of inflammation.  Correlate for possible clostridium difficile. Small volume free fluid is  likely reactive. No discrete or drainable fluid collection is  identified.  2.  Increased size of a 7.6 cm right adnexal cystic lesion, which is  incompletely assessed. Recommend further evaluation with pelvic  ultrasound.                 This report was finalized on 1/9/2022 6:07 PM by Dr. Marti Parra M.D.     XR Chest 1 View  ONE VIEW PORTABLE CHEST     HISTORY: Dizziness. Hypertension.     FINDINGS: The lungs are well-expanded and clear except for a calcified  granuloma at the left base. The heart is borderline enlarged and there  is no acute disease or change from 09/10/2021.     This report was finalized on 1/9/2022 1:45 PM by Dr. Kyle Montes M.D.        Scheduled Medications  cholecalciferol, 1,000 Units, Oral, Daily  montelukast, 10 mg, Oral, Nightly  potassium chloride, 40 mEq, Oral, Daily  sodium chloride, 10 mL, Intravenous, Q12H  vancomycin, 125 mg, Oral, Q6H    Infusions  lactated ringers, 100 mL/hr, Last Rate: 100 mL/hr (01/11/22 0232)    Diet  Diet Full Liquid; Thin       Assessment/Plan     Active Hospital Problems    Diagnosis  POA   • **Clostridium difficile infection [A49.8]  Yes   • TRACY (acute kidney injury) (HCC) [N17.9]  Yes   • Diarrhea of presumed infectious origin [R19.7]  Yes   • Suprapubic pain [R10.2]  Unknown   • Leukocytosis [D72.829]  Unknown   • Hyponatremia [E87.1]  Unknown   • Acidosis [E87.2]  Unknown      Resolved Hospital Problems   No resolved problems to display.       76 y.o. female admitted with diarrhea secondsary to C diff colitis and TRACY.    C diff colitis  -We will continue p.o. vancomycin  - Continue IV fluids  -WBC up slightly today, will continue to follow.  Not sure why as there is no evidence for worsening infection or change in exam    TRACY-almost resolved  -Continue fluids and recheck in a.m.  -Bladder scan does not show any obstruction    Borderline hypotension-obviously continue to hold home medications for BP.  Looks to be stable      SCDs  Discussed with her son by phone per request  Dispo: Family planning Home at discharge with someone to stay with her 24/7.  Continue PT and OT while here      Pablito Perez MD  [facility]  01/11/22  15:50 EST

## 2022-01-12 PROBLEM — N17.9 AKI (ACUTE KIDNEY INJURY): Status: RESOLVED | Noted: 2022-01-09 | Resolved: 2022-01-12

## 2022-01-12 PROBLEM — E87.1 HYPONATREMIA: Status: RESOLVED | Noted: 2022-01-09 | Resolved: 2022-01-12

## 2022-01-12 LAB
ANION GAP SERPL CALCULATED.3IONS-SCNC: 6.8 MMOL/L (ref 5–15)
BUN SERPL-MCNC: 19 MG/DL (ref 8–23)
BUN/CREAT SERPL: 27.9 (ref 7–25)
CALCIUM SPEC-SCNC: 9.1 MG/DL (ref 8.6–10.5)
CHLORIDE SERPL-SCNC: 105 MMOL/L (ref 98–107)
CO2 SERPL-SCNC: 24.2 MMOL/L (ref 22–29)
CREAT SERPL-MCNC: 0.68 MG/DL (ref 0.57–1)
DEPRECATED RDW RBC AUTO: 41.3 FL (ref 37–54)
ERYTHROCYTE [DISTWIDTH] IN BLOOD BY AUTOMATED COUNT: 12.9 % (ref 12.3–15.4)
GFR SERPL CREATININE-BSD FRML MDRD: 84 ML/MIN/1.73
GLUCOSE SERPL-MCNC: 95 MG/DL (ref 65–99)
HCT VFR BLD AUTO: 40.7 % (ref 34–46.6)
HGB BLD-MCNC: 13.9 G/DL (ref 12–15.9)
MCH RBC QN AUTO: 30.1 PG (ref 26.6–33)
MCHC RBC AUTO-ENTMCNC: 34.2 G/DL (ref 31.5–35.7)
MCV RBC AUTO: 88.1 FL (ref 79–97)
PLATELET # BLD AUTO: 226 10*3/MM3 (ref 140–450)
PMV BLD AUTO: 10.9 FL (ref 6–12)
POTASSIUM SERPL-SCNC: 4.4 MMOL/L (ref 3.5–5.2)
RBC # BLD AUTO: 4.62 10*6/MM3 (ref 3.77–5.28)
SODIUM SERPL-SCNC: 136 MMOL/L (ref 136–145)
WBC NRBC COR # BLD: 24.87 10*3/MM3 (ref 3.4–10.8)

## 2022-01-12 PROCEDURE — 85027 COMPLETE CBC AUTOMATED: CPT | Performed by: HOSPITALIST

## 2022-01-12 PROCEDURE — 80048 BASIC METABOLIC PNL TOTAL CA: CPT | Performed by: HOSPITALIST

## 2022-01-12 PROCEDURE — 97530 THERAPEUTIC ACTIVITIES: CPT

## 2022-01-12 RX ADMIN — VANCOMYCIN HYDROCHLORIDE 125 MG: 125 CAPSULE ORAL at 05:44

## 2022-01-12 RX ADMIN — POTASSIUM CHLORIDE 40 MEQ: 750 TABLET, EXTENDED RELEASE ORAL at 08:16

## 2022-01-12 RX ADMIN — VANCOMYCIN HYDROCHLORIDE 125 MG: 125 CAPSULE ORAL at 23:30

## 2022-01-12 RX ADMIN — VANCOMYCIN HYDROCHLORIDE 125 MG: 125 CAPSULE ORAL at 11:29

## 2022-01-12 RX ADMIN — SODIUM CHLORIDE, POTASSIUM CHLORIDE, SODIUM LACTATE AND CALCIUM CHLORIDE 100 ML/HR: 600; 310; 30; 20 INJECTION, SOLUTION INTRAVENOUS at 04:13

## 2022-01-12 RX ADMIN — Medication 1000 UNITS: at 08:16

## 2022-01-12 RX ADMIN — MONTELUKAST SODIUM 10 MG: 10 TABLET, FILM COATED ORAL at 22:00

## 2022-01-12 RX ADMIN — SODIUM CHLORIDE, PRESERVATIVE FREE 10 ML: 5 INJECTION INTRAVENOUS at 22:00

## 2022-01-12 RX ADMIN — VANCOMYCIN HYDROCHLORIDE 125 MG: 125 CAPSULE ORAL at 17:18

## 2022-01-12 NOTE — PLAN OF CARE
Goal Outcome Evaluation:           Progress: no change  Outcome Summary: A&Ox4. Pt on RA. Multiple BM's this shift. Incontinence care completed. Magic barrier cream applied. Up to chair. New IV in right wrist. LR @ 100 mL/hr. Advanced diet to full liquids, will continue to advance as tolerated. VSS. Will continue to monitor.

## 2022-01-12 NOTE — PLAN OF CARE
Goal Outcome Evaluation:              Outcome Summary: Pt placed on 1 L while asleep r/t desatting to 86-87% at times. SR-ST, AAOx4, VSS. Contact spore precautions maintained. Full liquid diet, advance as tolerated. Up to BSC. Multiple mucoid BM this shift. Urine output monitored. Excoriated bottom, magic barrier cream in place. Screened positive on sepsis screen, on call provider notified. PO vancomycin continued. Will CTM.

## 2022-01-12 NOTE — THERAPY TREATMENT NOTE
Her gastroesophageal reflux symptoms are under fairly good control. She does take Prilosec fairly regularly.   Patient Name: Gardenia Roper  : 1945    MRN: [id number]                              Today's Date: 2022       Admit Date: 2022    Visit Dx:     ICD-10-CM ICD-9-CM   1. TRACY (acute kidney injury) (HCC)  N17.9 584.9   2. Hyponatremia  E87.1 276.1   3. Diarrhea, unspecified type  R19.7 787.91   4. Leukocytosis, unspecified type  D72.829 288.60   5. Transient hypotension  I95.9 796.3     Patient Active Problem List   Diagnosis   • Right ureteral stone   • Hypertension   • Hyperlipidemia   • Hypercalcemia   • Gastroesophageal reflux disease   • Atopic rhinitis   • Medicare annual wellness visit, subsequent   • Vitamin D deficiency   • TRACY (acute kidney injury) (HCC)   • Diarrhea of presumed infectious origin   • Suprapubic pain   • Leukocytosis   • Hyponatremia   • Acidosis   • Clostridium difficile infection     Past Medical History:   Diagnosis Date   • Hyperlipidemia    • Hypertension    • Kidney stone on right side    • Osteoporosis    • Ovarian cyst      Past Surgical History:   Procedure Laterality Date   • ARTERIAL ANEURYSM REPAIR     • BRAIN SURGERY     • COSMETIC SURGERY     • OVARIAN CYST DRAINAGE/EXCISION     • OVARIAN CYST REMOVAL Left    • SINUS SURGERY     • URETEROSCOPY LASER LITHOTRIPSY WITH STENT INSERTION Right 2016    Procedure: CYSTO, RIGHT URETEROSCOPY , RIGHT STENT PLACEMENT, BILATERAL RETROGRADES;  Surgeon: Yuri Singh MD;  Location: [facility];  Service:    • URETEROSCOPY LASER LITHOTRIPSY WITH STENT INSERTION Right 2016    Procedure: RIGHT URETEROSCOPY LASER LITHOTRIPSY WITH STONE BASKET & STENT REMOVAL;  Surgeon: Yuri Singh MD;  Location: [facility];  Service:       General Information     Row Name 22 1152          Physical Therapy Time and Intention    Document Type therapy note (daily note)  -CB     Mode of Treatment individual therapy; physical therapy  -CB     Row Name 22 1152          General Information    Patient  Profile Reviewed yes  -CB     Existing Precautions/Restrictions fall  -CB     Row Name 01/12/22 1152          Cognition    Orientation Status (Cognition) oriented x 3  -CB     Row Name 01/12/22 1152          Safety Issues, Functional Mobility    Safety Issues Affecting Function (Mobility) awareness of need for assistance; insight into deficits/self-awareness; judgment; problem-solving; safety precaution awareness; safety precautions follow-through/compliance  -CB     Impairments Affecting Function (Mobility) balance; coordination; endurance/activity tolerance; postural/trunk control; strength  -CB     Comment, Safety Issues/Impairments (Mobility) gait belt and non skid socks  -CB           User Key  (r) = Recorded By, (t) = Taken By, (c) = Cosigned By    Initials Name Provider Type    CB Ailin Solis, PT Physical Therapist               Mobility     Row Name 01/12/22 1152          Bed Mobility    Bed Mobility supine-sit; sit-supine  -CB     Supine-Sit [place] (Bed Mobility) standby assist; verbal cues  -CB     Sit-Supine [place] (Bed Mobility) standby assist; verbal cues  -CB     Assistive Device (Bed Mobility) bed rails; head of bed elevated  -CB     Row Name 01/12/22 1152          Transfers    Comment (Transfers) sit<>stand x4  -CB     Row Name 01/12/22 1152          Sit-Stand Transfer    Sit-Stand [place] (Transfers) contact guard; verbal cues  -CB     Assistive Device (Sit-Stand Transfers) --  HHA  -CB     Row Name 01/12/22 1152          Gait/Stairs (Locomotion)    [place] Level (Gait) minimum assist (75% patient effort); verbal cues; nonverbal cues (demo/gesture)  -CB     Assistive Device (Gait) --  IV pole  -CB     Distance in Feet (Gait) 20ft  -CB     Deviations/Abnormal Patterns (Gait) guillermo decreased; base of support, wide; stride length decreased; weight shifting decreased; antalgic  -CB     Bilateral Gait Deviations forward flexed posture  -CB     Comment (Gait/Stairs) pt denies  use of rwx and unsteady on feet although no LOB.  -CB           User Key  (r) = Recorded By, (t) = Taken By, (c) = Cosigned By    Initials Name Provider Type    Ailin Marinelli PT Physical Therapist               Obj/Interventions     Row Name 01/12/22 1302          Balance    Balance Assessment sitting static balance; sitting dynamic balance; standing static balance; standing dynamic balance  -CB     Static Sitting Balance WFL; unsupported; sitting, edge of bed  -CB     Dynamic Sitting Balance WFL; unsupported; sitting, edge of bed  -CB     Static Standing Balance mild impairment; unsupported; standing  -CB     Dynamic Standing Balance mild impairment; supported; standing  -CB     Balance Interventions sitting; standing; sit to stand; supported; minimal challenge; static; dynamic  -CB           User Key  (r) = Recorded By, (t) = Taken By, (c) = Cosigned By    Initials Name Provider Type    Ailin Marinelli PT Physical Therapist               Goals/Plan    No documentation.                Clinical Impression     Row Name 01/12/22 1303          Pain    Additional Documentation Pain Scale: Numbers Pre/Post-Treatment (Group)  -CB     Row Name 01/12/22 1303          Pain Scale: Numbers Pre/Post-Treatment    Pretreatment Pain Rating 0/10 - no pain  -CB     Posttreatment Pain Rating 0/10 - no pain  -CB     Row Name 01/12/22 1303          Plan of Care Review    Plan of Care Reviewed With patient  -CB     Progress improving  -CB     Outcome Summary Patient is agreeable to PT this AM. She increased ambulation distance to 20ft holding IV pole requiring Poppy. Pt unsteady on feet but no overt LOB. Pt completed multiple STS with CGA. She continues to be incontinent with mobility which is also a limiting factor to increase mobility distance. Pt will continue to benefit from skilled PT. PT rec rehab at discharge. Pt would like BAR vs SNF.  -CB     Row Name 01/12/22 1309          Therapy Assessment/Plan (PT)    Patient/Family  Therapy Goals Statement (PT) wants to go to SNF  -CB     Row Name 01/12/22 1303          Positioning and Restraints    Pre-Treatment Position in bed  -CB     Post Treatment Position bed  -CB     In Bed notified nsg; fowlers; call light within reach; encouraged to call for assist; exit alarm on; side rails up x3  RN notified of incontinence  -CB           User Key  (r) = Recorded By, (t) = Taken By, (c) = Cosigned By    Initials Name Provider Type    Ailin Marinelli PT Physical Therapist               Outcome Measures     Row Name 01/12/22 1306          How much help from another person do you currently need...    Turning from your back to your side while in flat bed without using bedrails? 3  -CB     Moving from lying on back to sitting on the side of a flat bed without bedrails? 3  -CB     Moving to and from a bed to a chair (including a wheelchair)? 3  -CB     Standing up from a chair using your arms (e.g., wheelchair, bedside chair)? 3  -CB     Climbing 3-5 steps with a railing? 1  -CB     To walk in hospital room? 3  -CB     AM-PAC 6 Clicks Score (PT) 16  -CB     Row Name 01/12/22 1306          Functional Assessment    Outcome Measure Options AM-PAC 6 Clicks Basic Mobility (PT)  -CB           User Key  (r) = Recorded By, (t) = Taken By, (c) = Cosigned By    Initials Name Provider Type    Ailin Marinelli PT Physical Therapist                             Physical Therapy Education                 Title: PT OT SLP Therapies (In Progress)     Topic: Physical Therapy (In Progress)     Point: Mobility training (Done)     Learning Progress Summary           Patient Acceptance, E,TB,D, VU,NR by ALDO at 1/12/2022 1306    Acceptance, E,D, NR by EVITA at 1/11/2022 1900    Acceptance, E,TB,D, VU,NR by ALDO at 1/10/2022 1527                   Point: Home exercise program (In Progress)     Learning Progress Summary           Patient Acceptance, E,D, NR by EVITA at 1/11/2022 1900    Acceptance, E,TB,D, VU,NR by ALDO at 1/10/2022  1527                   Point: Body mechanics (Done)     Learning Progress Summary           Patient Acceptance, E,TB,D, VU,NR by CB at 1/12/2022 1306    Acceptance, E,D, NR by  at 1/11/2022 1900    Acceptance, E,TB,D, VU,NR by CB at 1/10/2022 1527                   Point: Precautions (Done)     Learning Progress Summary           Patient Acceptance, E,TB,D, VU,NR by  at 1/12/2022 1306    Acceptance, E,D, NR by  at 1/11/2022 1900    Acceptance, E,TB,D, VU,NR by CB at 1/10/2022 1527                               User Key     Initials Effective Dates Name Provider Type Discipline     03/07/18 -  Zehra Olivares PTA Physical Therapy Assistant PT     10/22/21 -  Ailin Solis, HATTIE Physical Therapist PT              PT Recommendation and Plan  Planned Therapy Interventions (PT): balance training, bed mobility training, gait training, home exercise program, patient/family education, strengthening, transfer training  Plan of Care Reviewed With: patient  Progress: improving  Outcome Summary: Patient is agreeable to PT this AM. She increased ambulation distance to 20ft holding IV pole requiring Poppy. Pt unsteady on feet but no overt LOB. Pt completed multiple STS with CGA. She continues to be incontinent with mobility which is also a limiting factor to increase mobility distance. Pt will continue to benefit from skilled PT. PT rec rehab at discharge. Pt would like BAR vs SNF.     Time Calculation:    PT Charges     Row Name 01/12/22 1309             Time Calculation    Start Time 0939  -CB      Stop Time 0953  -CB      Time Calculation (min) 14 min  -CB      PT Received On 01/12/22  -CB      PT - Next Appointment 01/13/22  -CB              Time Calculation- PT    Total Timed Code Minutes- PT 14 minute(s)  -CB              Timed Charges    58350 - PT Therapeutic Activity Minutes 14  -CB              Total Minutes    Timed Charges Total Minutes 14  -CB       Total Minutes 14  -CB            User Key  (r) = Recorded  By, (t) = Taken By, (c) = Cosigned By    Initials Name Provider Type    CB Ailin Solis, PT Physical Therapist              Therapy Charges for Today     Code Description Service Date Service Provider Modifiers Qty    99297774673 HC PT THERAPEUTIC ACT EA 15 MIN 1/12/2022 Ailin Solis, PT GP 1          PT G-Codes  Outcome Measure Options: AM-PAC 6 Clicks Basic Mobility (PT)  AM-PAC 6 Clicks Score (PT): 16  AM-PAC 6 Clicks Score (OT): 17  Modified [place] Scale: 4 - Moderately severe disability.  Unable to walk without assistance, and unable to attend to own bodily needs without assistance.    Ailin Solis, HATTIE  1/12/2022

## 2022-01-12 NOTE — THERAPY TREATMENT NOTE
Patient Name: Gardenia Roper  : 1945    MRN: [id number]                              Today's Date: 2022       Admit Date: 2022    Visit Dx:     ICD-10-CM ICD-9-CM   1. TRACY (acute kidney injury) (HCC)  N17.9 584.9   2. Hyponatremia  E87.1 276.1   3. Diarrhea, unspecified type  R19.7 787.91   4. Leukocytosis, unspecified type  D72.829 288.60   5. Transient hypotension  I95.9 796.3     Patient Active Problem List   Diagnosis   • Right ureteral stone   • Hypertension   • Hyperlipidemia   • Hypercalcemia   • Gastroesophageal reflux disease   • Atopic rhinitis   • Medicare annual wellness visit, subsequent   • Vitamin D deficiency   • TRACY (acute kidney injury) (HCC)   • Diarrhea of presumed infectious origin   • Suprapubic pain   • Leukocytosis   • Hyponatremia   • Acidosis   • Clostridium difficile infection     Past Medical History:   Diagnosis Date   • Hyperlipidemia    • Hypertension    • Kidney stone on right side    • Osteoporosis    • Ovarian cyst      Past Surgical History:   Procedure Laterality Date   • ARTERIAL ANEURYSM REPAIR     • BRAIN SURGERY     • COSMETIC SURGERY     • OVARIAN CYST DRAINAGE/EXCISION     • OVARIAN CYST REMOVAL Left    • SINUS SURGERY     • URETEROSCOPY LASER LITHOTRIPSY WITH STENT INSERTION Right 2016    Procedure: CYSTO, RIGHT URETEROSCOPY , RIGHT STENT PLACEMENT, BILATERAL RETROGRADES;  Surgeon: Yuri Singh MD;  Location: [facility];  Service:    • URETEROSCOPY LASER LITHOTRIPSY WITH STENT INSERTION Right 2016    Procedure: RIGHT URETEROSCOPY LASER LITHOTRIPSY WITH STONE BASKET & STENT REMOVAL;  Surgeon: Yuri Singh MD;  Location: [facility];  Service:       General Information     Row Name 22 183          Physical Therapy Time and Intention    Document Type therapy note (daily note)  -     Mode of Treatment individual therapy; physical therapy  -     Row Name 22 183          General Information    Patient  Profile Reviewed yes  -     Existing Precautions/Restrictions fall  -     Row Name 01/11/22 1839          Living Environment    Lives With alone  -     Row Name 01/11/22 1839          Cognition    Orientation Status (Cognition) oriented x 3  -     Row Name 01/11/22 1839          Safety Issues, Functional Mobility    Safety Issues Affecting Function (Mobility) awareness of need for assistance; insight into deficits/self-awareness; judgment; problem-solving; safety precaution awareness  -     Impairments Affecting Function (Mobility) balance; coordination; endurance/activity tolerance; postural/trunk control; strength  -           User Key  (r) = Recorded By, (t) = Taken By, (c) = Cosigned By    Initials Name Provider Type    Zehra Lopez PTA Physical Therapy Assistant               Mobility     Row Name 01/11/22 1840          Bed Mobility    Sit-Supine Homer (Bed Mobility) minimum assist (75% patient effort); verbal cues  -     Assistive Device (Bed Mobility) bed rails  -     Row Name 01/11/22 1840          Sit-Stand Transfer    Sit-Stand Homer (Transfers) contact guard; verbal cues  -     Assistive Device (Sit-Stand Transfers) --  HHA  -     Row Name 01/11/22 1840          Gait/Stairs (Locomotion)    Homer Level (Gait) minimum assist (75% patient effort); contact guard; verbal cues; nonverbal cues (demo/gesture)  -     Assistive Device (Gait) --  HHA  -     Distance in Feet (Gait) 12ft , pt unsteady , seemed unaware she had LOB , R lean, shuffling and forw flex  -     Deviations/Abnormal Patterns (Gait) guillermo decreased; base of support, wide; festinating/shuffling; stride length decreased; weight shifting decreased  -     Bilateral Gait Deviations forward flexed posture  -     Right Sided Gait Deviations leans right  -     Comment (Gait/Stairs) educ w/pt on needing assist to amb and tfer, will need to use her rwx initially if DC home ; pt states she  "thinks she did well w/walking , but educ on her being unsafe to be home alone  -           User Key  (r) = Recorded By, (t) = Taken By, (c) = Cosigned By    Initials Name Provider Type    Zehra Lopez PTA Physical Therapy Assistant               Obj/Interventions     Row Name 01/11/22 1843          Motor Skills    Therapeutic Exercise --  LAQS x10  -[facility] Name 01/11/22 1843          Balance    Static Standing Balance mild impairment; supported; standing  -           User Key  (r) = Recorded By, (t) = Taken By, (c) = Cosigned By    Initials Name Provider Type    Zehra Lopez PTA Physical Therapy Assistant               Goals/Plan    No documentation.                Clinical Impression     [facility] Name 01/11/22 1854          Pain Scale: Numbers Pre/Post-Treatment    Pretreatment Pain Rating 0/10 - no pain  -     Posttreatment Pain Rating 0/10 - no pain  -     Pre/Posttreatment Pain Comment \"just where IV messed up on L arm\"  -[facility] Name 01/11/22 1854          Plan of Care Review    Plan of Care Reviewed With patient  -     Outcome Summary Pt agreed to PT session after encouragement; pt had some LOB, R lean, shuffling , wt shift difficulty so suggested if she DC s home she will need to initially use rwx and have her son assist ; she seemed unaware and thought she did well amb 12ft  -[facility] Name 01/11/22 1854          Therapy Assessment/Plan (PT)    Rehab Potential (PT) good, to achieve stated therapy goals  -     Criteria for Skilled Interventions Met (PT) yes  -[facility] Name 01/11/22 1854          Vital Signs    O2 Delivery Pre Treatment room air  -[facility] Name 01/11/22 1854          Positioning and Restraints    Pre-Treatment Position other (comment)  sitting EOB w/nsg  -     Post Treatment Position bed  -     In Bed fowlers; call light within reach; encouraged to call for assist; exit alarm on; with nsg; side rails up x3  -           User Key  (r) = Recorded By, (t) = " Taken By, (c) = Cosigned By    Initials Name Provider Type    Zehra Lopez PTA Physical Therapy Assistant               Outcome Measures     Row Name 01/11/22 8499          How much help from another person do you currently need...    Turning from your back to your side while in flat bed without using bedrails? 3  -JM     Moving from lying on back to sitting on the side of a flat bed without bedrails? 3  -JM     Moving to and from a bed to a chair (including a wheelchair)? 3  -JM     Standing up from a chair using your arms (e.g., wheelchair, bedside chair)? 3  -JM     Climbing 3-5 steps with a railing? 1  -JM     To walk in hospital room? 3  -     AM-PAC 6 Clicks Score (PT) 16  -     Row Name 01/11/22 0919          Modified [place] Scale    Modified [place] Scale 4 - Moderately severe disability.  Unable to walk without assistance, and unable to attend to own bodily needs without assistance.  -     Row Name 01/11/22 0919          Functional Assessment    Outcome Measure Options AM-PAC 6 Clicks Daily Activity (OT); Modified [place]  -BL           User Key  (r) = Recorded By, (t) = Taken By, (c) = Cosigned By    Initials Name Provider Type    Zehra Lopez PTA Physical Therapy Assistant    Denis Nix OT Occupational Therapist                             Physical Therapy Education                 Title: PT OT SLP Therapies (In Progress)     Topic: Physical Therapy (In Progress)     Point: Mobility training (In Progress)     Learning Progress Summary           Patient Acceptance, E,D, NR by EVITA at 1/11/2022 1900    Acceptance, E,TB,D, VU,NR by ALDO at 1/10/2022 1527                   Point: Home exercise program (In Progress)     Learning Progress Summary           Patient Acceptance, E,D, NR by EVITA at 1/11/2022 1900    Acceptance, E,TB,D, VU,NR by ALDO at 1/10/2022 1527                   Point: Body mechanics (In Progress)     Learning Progress Summary           Patient Acceptance, E,D, NR by EVITA  at 1/11/2022 1900    Acceptance, E,TB,D, VU,NR by CB at 1/10/2022 1527                   Point: Precautions (In Progress)     Learning Progress Summary           Patient Acceptance, E,D, NR by  at 1/11/2022 1900    Acceptance, E,TB,D, VU,NR by  at 1/10/2022 1527                               User Key     Initials Effective Dates Name Provider Type [facility] 03/07/18 -  Zehra Olivares PTA Physical Therapy Assistant PT    CB 10/22/21 -  Ailin Solis, HATTIE Physical Therapist PT              PT Recommendation and Plan     Plan of Care Reviewed With: patient  Outcome Summary: Pt agreed to PT session after encouragement; pt had some LOB, R lean, shuffling , wt shift difficulty so suggested if she DC s home she will need to initially use rwx and have her son assist ; she seemed unaware and thought she did well amb 12ft     Time Calculation:    PT Charges     Row Name 01/11/22 1838             Time Calculation    Start Time 1010  -      Stop Time 1041  -      Time Calculation (min) 31 min  -      PT Received On 01/11/22  -      PT - Next Appointment 01/12/22  -            User Key  (r) = Recorded By, (t) = Taken By, (c) = Cosigned By    Initials Name Provider Type     Zehra Olivares PTA Physical Therapy Assistant              Therapy Charges for Today     Code Description Service Date Service Provider Modifiers Qty    03142697471 HC PT THER PROC EA 15 MIN 1/11/2022 Zehra Olivares PTA GP 2          PT G-Codes  Outcome Measure Options: AM-PAC 6 Clicks Daily Activity (OT), Modified [place]  AM-PAC 6 Clicks Score (PT): 16  AM-PAC 6 Clicks Score (OT): 17  Modified Lenore Scale: 4 - Moderately severe disability.  Unable to walk without assistance, and unable to attend to own bodily needs without assistance.    Zehra Olivares PTA  1/11/2022

## 2022-01-12 NOTE — PLAN OF CARE
Patient still having several bowl movements, still liquid. Patient now on GI soft diet and tolerating well. Contact spore precautions maintained. IVF DC this shift. All needs met this shift. VSS. WCTM.         Problem: Adult Inpatient Plan of Care  Goal: Plan of Care Review  Outcome: Ongoing, Progressing  Flowsheets  Taken 1/12/2022 1603 by Areli Mejias, RN  Progress: improving  Taken 1/12/2022 1303 by Ailin Solis, PT  Plan of Care Reviewed With: patient   Goal Outcome Evaluation:           Progress: improving

## 2022-01-12 NOTE — PROGRESS NOTES
Name: Gardenia Roper ADMIT: 2022   : 1945  PCP: Jim Hampton Jr., MD    MRN: [id number] LOS: 3 days   AGE/SEX: 76 y.o. female  ROOM: [facility], [place]     Subjective   Subjective   Still having diarrhea but improving. Denies dysuria or cough    Review of Systems     Objective   Objective   Vital Signs  Temp:  [96.3 °F (35.7 °C)-97.2 °F (36.2 °C)] 97 °F (36.1 °C)  Heart Rate:  [] 107  Resp:  [16] 16  BP: (111-126)/(57-85) 115/61  SpO2:  [91 %-95 %] 94 %  on  Flow (L/min):  [1] 1;   Device (Oxygen Therapy): room air  Body mass index is 27.83 kg/m².  Physical Exam  Vitals and nursing note reviewed.   Constitutional:       Appearance: She is not ill-appearing.   HENT:      Head: Normocephalic and atraumatic.   Eyes:      General: No scleral icterus.  Cardiovascular:      Rate and Rhythm: Normal rate and regular rhythm.      Heart sounds: No murmur heard.      Pulmonary:      Effort: No respiratory distress.      Breath sounds: Normal breath sounds.   Abdominal:      General: Bowel sounds are normal. There is no distension.      Palpations: Abdomen is soft.      Tenderness: There is no abdominal tenderness.   Musculoskeletal:      Right lower leg: No edema.      Left lower leg: No edema.   Skin:     General: Skin is warm and dry.      Coloration: Skin is not pale.   Neurological:      General: No focal deficit present.      Mental Status: She is alert and oriented to person, place, and time.      Motor: Weakness present.         Results Review     I reviewed the patient's new clinical results.  Results from last 7 days   Lab Units 22  0808 22  0541 01/10/22  0747 22  1142   WBC 10*3/mm3 24.87* 22.58* 18.14* 22.73*   HEMOGLOBIN g/dL 13.9 13.4 13.3 14.8   PLATELETS 10*3/mm3 226 192 198 233     Results from last 7 days   Lab Units 22  0808 22  0541 01/10/22  0747 22  1142   SODIUM mmol/L 136 135* 134* 127*   POTASSIUM mmol/L 4.4 3.5 3.2* 3.6   CHLORIDE mmol/L 105 103 98 92*    CO2 mmol/L 24.2 25.0 22.7 19.7*   BUN mg/dL 19 39* 58* 73*   CREATININE mg/dL 0.68 1.12* 2.00* 3.63*   GLUCOSE mg/dL 95 82 63* 81   EGFR IF NONAFRICN AM mL/min/1.73 84 47* 24* 12*     Results from last 7 days   Lab Units 01/09/22  1142   ALBUMIN g/dL 3.00*   BILIRUBIN mg/dL 0.5   ALK PHOS U/L 83   AST (SGOT) U/L 33*   ALT (SGPT) U/L 31     Results from last 7 days   Lab Units 01/12/22  0808 01/11/22  0541 01/10/22  0747 01/09/22  1142   CALCIUM mg/dL 9.1 9.2 9.2 10.1   ALBUMIN g/dL  --   --   --  3.00*   MAGNESIUM mg/dL  --   --  2.7* 3.0*   PHOSPHORUS mg/dL  --   --  4.7*  --      Results from last 7 days   Lab Units 01/09/22  1246   LACTATE mmol/L 1.5     COVID19   Date Value Ref Range Status   01/09/2022 Not Detected Not Detected - Ref. Range Final     Glucose   Date/Time Value Ref Range Status   01/10/2022 1136 100 70 - 130 mg/dL Final     Comment:     Meter: [id number] : [id number] Gaudencio Yoder SIGRID   01/10/2022 0954 67 (L) 70 - 130 mg/dL Final     Comment:     Meter: [id number] : [id number] Mauricio Morris RN       CT Abdomen Pelvis Without Contrast  Narrative: CT ABDOMEN PELVIS WO CONTRAST-     HISTORY:  Suprapubic abdominal pain.      TECHNIQUE:  CT images of the abdomen and pelvis were obtained without  intravenous contrast. Reformatted images were reviewed. Radiation dose  reduction techniques were utilized, including automated exposure control  and exposure modulation based on body size.     COMPARISON:  CT abdomen and pelvis without contrast 04/19/2016     FINDINGS CT ABDOMEN/PELVIS: Heart size is normal. There is no  pericardial effusion. There is calcific coronary artery atherosclerosis.  There is mild bibasilar atelectasis and/or scarring. Pleural spaces are  clear.  The liver is normal in size. Layering hyperattenuation within the  gallbladder lumen is suggestive of biliary sludge and/or stones. There  are no pancreatic calcifications. There are calcified splenic  granulomata. Adrenal glands within  normal limits. There are incompletely  assessed bilateral renal lesions, some of which measure greater than  simple fluid density. There is a tiny nonobstructing superior right  renal stone. There is no hydronephrosis. There is extensive calcific  aortoiliac and branch vessel atherosclerosis.  There is a small hiatal hernia. There is colonic diverticulosis. There  is relatively diffuse colonic wall thickening with moderate pericolonic  fat stranding. There is also wall thickening and fat stranding involving  the rectum. The appendix is present and normal in size. There is new  small volume fluid, which is likely reactive. No discrete or drainable  fluid collection is identified. The bladder is well distended and within  normal limits. The uterus is present. There is a 7.6 cm right adnexal  cystic lesion, which has increased in size 5.0 cm in 2016.  There is multilevel degenerative disc disease.     Limitations: Evaluation of the solid parenchymal organs and vasculature  is limited due to lack of intravenous contrast.        Impression:    1.  Colonic diverticulosis with relatively diffuse corresponding colonic  wall thickening and moderate pericolonic fat stranding, which raises  concern for a nonspecific pan-proctocolitis with multifocal  diverticulitis considered less likely given the extent of inflammation.  Correlate for possible clostridium difficile. Small volume free fluid is  likely reactive. No discrete or drainable fluid collection is  identified.  2.  Increased size of a 7.6 cm right adnexal cystic lesion, which is  incompletely assessed. Recommend further evaluation with pelvic  ultrasound.                 This report was finalized on 1/9/2022 6:07 PM by Dr. Marti Parra M.D.     XR Chest 1 View  ONE VIEW PORTABLE CHEST     HISTORY: Dizziness. Hypertension.     FINDINGS: The lungs are well-expanded and clear except for a calcified  granuloma at the left base. The heart is borderline enlarged and  there  is no acute disease or change from 09/10/2021.     This report was finalized on 1/9/2022 1:45 PM by Dr. Kyle Montes M.D.       Scheduled Medications  cholecalciferol, 1,000 Units, Oral, Daily  montelukast, 10 mg, Oral, Nightly  potassium chloride, 40 mEq, Oral, Daily  sodium chloride, 10 mL, Intravenous, Q12H  vancomycin, 125 mg, Oral, Q6H    Infusions   Diet  Diet Regular; Thin; GI Soft       Assessment/Plan     Active Hospital Problems    Diagnosis  POA   • **Clostridium difficile infection [A49.8]  Yes   • Diarrhea of presumed infectious origin [R19.7]  Yes   • Suprapubic pain [R10.2]  Unknown   • Leukocytosis [D72.829]  Unknown   • Acidosis [E87.2]  Unknown      Resolved Hospital Problems    Diagnosis Date Resolved POA   • TRACY (acute kidney injury) (HCC) [N17.9] 01/12/2022 Yes   • Hyponatremia [E87.1] 01/12/2022 Unknown       76 y.o. female admitted with diarrhea secondsary to C diff colitis and TRACY.    C diff colitis  -We will continue p.o. vancomycin  -WBC still trending upward, not sure why as exam remains benign and she really does not have any other focal symptoms.  Reviewed admission work-up and her chest x-ray was negative.  No UA was ever done so we will check this now.  If continues to trend upward will consider further imaging  -Consider addition of Questran tomorrow if stools are not improving though they seem to be on the right track    Borderline hypotension- improved but will continue to hold home meds.       SCDs  Dispo: Patient now wanting rehab placement.  Should be stable to go whenever this arranged as long as WBC is stable      Pablito Perez MD  [facility]  01/12/22  16:21 EST

## 2022-01-12 NOTE — PLAN OF CARE
Goal Outcome Evaluation:  Plan of Care Reviewed With: patient        Progress: improving  Outcome Summary: Patient is agreeable to PT this AM. She increased ambulation distance to 20ft holding IV pole requiring Poppy. Pt unsteady on feet but no overt LOB. Pt completed multiple STS with CGA. She continues to be incontinent with mobility which is also a limiting factor to increase mobility distance. Pt will continue to benefit from skilled PT. PT rec rehab at discharge. Pt would like [facility] vs SNF.    Patient was intermittently wearing a face mask during this therapy encounter. Therapist used appropriate personal protective equipment including mask and gloves.  Mask used was standard procedure mask. Appropriate PPE was worn during the entire therapy session. Hand hygiene was completed before and after therapy session. Patient is not in enhanced droplet precautions.

## 2022-01-13 ENCOUNTER — APPOINTMENT (OUTPATIENT)
Dept: CARDIOLOGY | Facility: HOSPITAL | Age: 77
End: 2022-01-13

## 2022-01-13 PROBLEM — A41.89 OTHER SPECIFIED SEPSIS (HCC): Status: ACTIVE | Noted: 2022-01-13

## 2022-01-13 LAB
BH CV LOWER VASCULAR LEFT COMMON FEMORAL AUGMENT: NORMAL
BH CV LOWER VASCULAR LEFT COMMON FEMORAL COMPETENT: NORMAL
BH CV LOWER VASCULAR LEFT COMMON FEMORAL COMPRESS: NORMAL
BH CV LOWER VASCULAR LEFT COMMON FEMORAL PHASIC: NORMAL
BH CV LOWER VASCULAR LEFT COMMON FEMORAL SPONT: NORMAL
BH CV LOWER VASCULAR LEFT DISTAL FEMORAL COMPRESS: NORMAL
BH CV LOWER VASCULAR LEFT GASTRONEMIUS COMPRESS: NORMAL
BH CV LOWER VASCULAR LEFT GREATER SAPH AK COMPRESS: NORMAL
BH CV LOWER VASCULAR LEFT GREATER SAPH BK COMPRESS: NORMAL
BH CV LOWER VASCULAR LEFT LESSER SAPH COMPRESS: NORMAL
BH CV LOWER VASCULAR LEFT MID FEMORAL AUGMENT: NORMAL
BH CV LOWER VASCULAR LEFT MID FEMORAL COMPETENT: NORMAL
BH CV LOWER VASCULAR LEFT MID FEMORAL COMPRESS: NORMAL
BH CV LOWER VASCULAR LEFT MID FEMORAL PHASIC: NORMAL
BH CV LOWER VASCULAR LEFT MID FEMORAL SPONT: NORMAL
BH CV LOWER VASCULAR LEFT PERONEAL COMPRESS: NORMAL
BH CV LOWER VASCULAR LEFT POPLITEAL AUGMENT: NORMAL
BH CV LOWER VASCULAR LEFT POPLITEAL COMPETENT: NORMAL
BH CV LOWER VASCULAR LEFT POPLITEAL COMPRESS: NORMAL
BH CV LOWER VASCULAR LEFT POPLITEAL PHASIC: NORMAL
BH CV LOWER VASCULAR LEFT POPLITEAL SPONT: NORMAL
BH CV LOWER VASCULAR LEFT POSTERIOR TIBIAL COMPRESS: NORMAL
BH CV LOWER VASCULAR LEFT PROFUNDA FEMORAL COMPRESS: NORMAL
BH CV LOWER VASCULAR LEFT PROXIMAL FEMORAL COMPRESS: NORMAL
BH CV LOWER VASCULAR LEFT SAPHENOFEMORAL JUNCTION COMPRESS: NORMAL
BH CV LOWER VASCULAR RIGHT COMMON FEMORAL AUGMENT: NORMAL
BH CV LOWER VASCULAR RIGHT COMMON FEMORAL COMPETENT: NORMAL
BH CV LOWER VASCULAR RIGHT COMMON FEMORAL COMPRESS: NORMAL
BH CV LOWER VASCULAR RIGHT COMMON FEMORAL PHASIC: NORMAL
BH CV LOWER VASCULAR RIGHT COMMON FEMORAL SPONT: NORMAL
BH CV LOWER VASCULAR RIGHT DISTAL FEMORAL COMPRESS: NORMAL
BH CV LOWER VASCULAR RIGHT GASTRONEMIUS COMPRESS: NORMAL
BH CV LOWER VASCULAR RIGHT GREATER SAPH AK COMPRESS: NORMAL
BH CV LOWER VASCULAR RIGHT GREATER SAPH BK COMPRESS: NORMAL
BH CV LOWER VASCULAR RIGHT LESSER SAPH COMPRESS: NORMAL
BH CV LOWER VASCULAR RIGHT MID FEMORAL AUGMENT: NORMAL
BH CV LOWER VASCULAR RIGHT MID FEMORAL COMPETENT: NORMAL
BH CV LOWER VASCULAR RIGHT MID FEMORAL COMPRESS: NORMAL
BH CV LOWER VASCULAR RIGHT MID FEMORAL PHASIC: NORMAL
BH CV LOWER VASCULAR RIGHT MID FEMORAL SPONT: NORMAL
BH CV LOWER VASCULAR RIGHT PERONEAL COMPRESS: NORMAL
BH CV LOWER VASCULAR RIGHT POPLITEAL AUGMENT: NORMAL
BH CV LOWER VASCULAR RIGHT POPLITEAL COMPETENT: NORMAL
BH CV LOWER VASCULAR RIGHT POPLITEAL COMPRESS: NORMAL
BH CV LOWER VASCULAR RIGHT POPLITEAL PHASIC: NORMAL
BH CV LOWER VASCULAR RIGHT POPLITEAL SPONT: NORMAL
BH CV LOWER VASCULAR RIGHT POSTERIOR TIBIAL COMPRESS: NORMAL
BH CV LOWER VASCULAR RIGHT PROFUNDA FEMORAL COMPRESS: NORMAL
BH CV LOWER VASCULAR RIGHT PROXIMAL FEMORAL COMPRESS: NORMAL
BH CV LOWER VASCULAR RIGHT SAPHENOFEMORAL JUNCTION COMPRESS: NORMAL
BILIRUB UR QL STRIP: NEGATIVE
CLARITY UR: CLEAR
COLOR UR: YELLOW
DEPRECATED RDW RBC AUTO: 43 FL (ref 37–54)
ERYTHROCYTE [DISTWIDTH] IN BLOOD BY AUTOMATED COUNT: 12.8 % (ref 12.3–15.4)
GLUCOSE UR STRIP-MCNC: NEGATIVE MG/DL
HCT VFR BLD AUTO: 44.9 % (ref 34–46.6)
HGB BLD-MCNC: 14.8 G/DL (ref 12–15.9)
HGB UR QL STRIP.AUTO: NEGATIVE
KETONES UR QL STRIP: NEGATIVE
LEUKOCYTE ESTERASE UR QL STRIP.AUTO: NEGATIVE
MAXIMAL PREDICTED HEART RATE: 142 BPM
MCH RBC QN AUTO: 30 PG (ref 26.6–33)
MCHC RBC AUTO-ENTMCNC: 33 G/DL (ref 31.5–35.7)
MCV RBC AUTO: 91.1 FL (ref 79–97)
NITRITE UR QL STRIP: NEGATIVE
PH UR STRIP.AUTO: 6 [PH] (ref 5–8)
PLATELET # BLD AUTO: 262 10*3/MM3 (ref 140–450)
PMV BLD AUTO: 10 FL (ref 6–12)
PROCALCITONIN SERPL-MCNC: 0.1 NG/ML (ref 0–0.25)
PROT UR QL STRIP: NEGATIVE
RBC # BLD AUTO: 4.93 10*6/MM3 (ref 3.77–5.28)
SP GR UR STRIP: 1.02 (ref 1–1.03)
STRESS TARGET HR: 121 BPM
UROBILINOGEN UR QL STRIP: NORMAL
WBC NRBC COR # BLD: 26.52 10*3/MM3 (ref 3.4–10.8)

## 2022-01-13 PROCEDURE — 85027 COMPLETE CBC AUTOMATED: CPT | Performed by: HOSPITALIST

## 2022-01-13 PROCEDURE — 97110 THERAPEUTIC EXERCISES: CPT

## 2022-01-13 PROCEDURE — 84145 PROCALCITONIN (PCT): CPT | Performed by: HOSPITALIST

## 2022-01-13 PROCEDURE — 93970 EXTREMITY STUDY: CPT

## 2022-01-13 PROCEDURE — 81003 URINALYSIS AUTO W/O SCOPE: CPT | Performed by: HOSPITALIST

## 2022-01-13 RX ADMIN — VANCOMYCIN HYDROCHLORIDE 125 MG: 125 CAPSULE ORAL at 23:33

## 2022-01-13 RX ADMIN — MONTELUKAST SODIUM 10 MG: 10 TABLET, FILM COATED ORAL at 21:17

## 2022-01-13 RX ADMIN — SODIUM CHLORIDE, PRESERVATIVE FREE 10 ML: 5 INJECTION INTRAVENOUS at 21:18

## 2022-01-13 RX ADMIN — POTASSIUM CHLORIDE 40 MEQ: 750 TABLET, EXTENDED RELEASE ORAL at 08:34

## 2022-01-13 RX ADMIN — SODIUM CHLORIDE, PRESERVATIVE FREE 10 ML: 5 INJECTION INTRAVENOUS at 08:32

## 2022-01-13 RX ADMIN — VANCOMYCIN HYDROCHLORIDE 125 MG: 125 CAPSULE ORAL at 06:10

## 2022-01-13 RX ADMIN — VANCOMYCIN HYDROCHLORIDE 125 MG: 125 CAPSULE ORAL at 17:35

## 2022-01-13 RX ADMIN — Medication 1000 UNITS: at 08:32

## 2022-01-13 RX ADMIN — VANCOMYCIN HYDROCHLORIDE 125 MG: 125 CAPSULE ORAL at 11:51

## 2022-01-13 NOTE — PROGRESS NOTES
"Enter Query Response Below      Query Response:     Sepsis was present on admission         If applicable, please update the problem list.     Patient: Gardenia Roper        : 1945  Account: [id number]           Admit Date: 2022        Options to Respond to Query:    1. Access the Encounter     a. From the To-Do Side bar, click Respond With Note.     b. Click New Note     c. Answer query within the yellow box.                d. Update the Problem List if applicable.     Dr. Perez:     Patient admitted with enterocolitis due to Clostridium difficile and acute kidney injury. Patient presented with a WBC count of 22.73 and lactate 1.5. Blood pressure dropped to 60's-70's/50's shortly after admit requiring a fluid bolus. H&P included the following documentation: \"Noted acute renal failure with underlying infectious process meets criteria for acute sepsis.\" Patient was treated with IV fluids and oral Vancomycin. Sepsis was not listed as a diagnosis on subsequent physician progress notes.     After study, can you further clarify the patient's condition as:    Sepsis ruled out   Sepsis ruled in   Other (please specify)_________________  Clinically indeterminable     By submitting this query, we are merely seeking further clarification of documentation to accurately reflect all conditions that you are monitoring, evaluating, treating or that extend the hospitalization or utilize additional resources of care. Please utilize your independent clinical judgment when addressing the question(s) above.     This query and your response, once completed, will be entered into the legal medical record.    Sincerely,  Tamica CARROLL, SHERRY, CCDS  Clinical Documentation Integrity Program   [email or web address]   "

## 2022-01-13 NOTE — CASE MANAGEMENT/SOCIAL WORK
Continued Stay Note  [facility]     Patient Name: Gardenia Roper  MRN: [id number]  Today's Date: 1/13/2022    Admit Date: 1/9/2022     Discharge Plan     Row Name 01/13/22 1659       Plan    Plan [facility]- bed available over the weekend    Patient/Family in Agreement with Plan yes    Plan Comments Spoke with Gemma and [facility] can accept over the weekend.  [facility] is waiving pre-cert at this time.  Spoke with son, Brent, and he is agreeable.  Transfer packet in cubbie.  [facility], [facility] requested that Vancomycin pills be changed to elixir.  Sticky note left for MD and note on transfer packet. Paulina Moreland RN               Discharge Codes    No documentation.               Expected Discharge Date and Time     Expected Discharge Date Expected Discharge Time    Huy 15, 2022             Paulina Moreland RN

## 2022-01-13 NOTE — PROGRESS NOTES
Name: Gardenia Roper ADMIT: 2022   : 1945  PCP: Jim Hampton Jr., MD    MRN: [id number] LOS: 4 days   AGE/SEX: 76 y.o. female  ROOM: [facility]     Subjective   Subjective   Stools firming up and has no other complaints.  Looks great    Review of Systems     Objective   Objective   Vital Signs  Temp:  [96.2 °F (35.7 °C)-97.9 °F (36.6 °C)] 96.2 °F (35.7 °C)  Heart Rate:  [] 112  Resp:  [16] 16  BP: (118-129)/(68-82) 118/68  SpO2:  [94 %-97 %] 96 %  on  Flow (L/min):  [1] 1;   Device (Oxygen Therapy): room air  Body mass index is 28.47 kg/m².  Physical Exam  Vitals and nursing note reviewed.   Constitutional:       Appearance: She is not ill-appearing.   HENT:      Head: Normocephalic and atraumatic.   Eyes:      General: No scleral icterus.  Cardiovascular:      Rate and Rhythm: Normal rate and regular rhythm.      Heart sounds: No murmur heard.      Pulmonary:      Effort: No respiratory distress.      Breath sounds: Normal breath sounds.   Abdominal:      General: Bowel sounds are normal. There is no distension.      Palpations: Abdomen is soft.      Tenderness: There is no abdominal tenderness.   Musculoskeletal:      Right lower leg: No edema.      Left lower leg: No edema.   Skin:     General: Skin is warm and dry.      Coloration: Skin is not pale.   Neurological:      General: No focal deficit present.      Mental Status: She is alert and oriented to person, place, and time.      Motor: Weakness present.         Results Review     I reviewed the patient's new clinical results.  Results from last 7 days   Lab Units 22  0819 22  0808 22  0541 01/10/22  0747   WBC 10*3/mm3 26.52* 24.87* 22.58* 18.14*   HEMOGLOBIN g/dL 14.8 13.9 13.4 13.3   PLATELETS 10*3/mm3 262 226 192 198     Results from last 7 days   Lab Units 22  0808 22  0541 01/10/22  0747 22  1142   SODIUM mmol/L 136 135* 134* 127*   POTASSIUM mmol/L 4.4 3.5 3.2* 3.6   CHLORIDE mmol/L 105 103 98 92*    CO2 mmol/L 24.2 25.0 22.7 19.7*   BUN mg/dL 19 39* 58* 73*   CREATININE mg/dL 0.68 1.12* 2.00* 3.63*   GLUCOSE mg/dL 95 82 63* 81   EGFR IF NONAFRICN AM mL/min/1.73 84 47* 24* 12*     Results from last 7 days   Lab Units 01/09/22  1142   ALBUMIN g/dL 3.00*   BILIRUBIN mg/dL 0.5   ALK PHOS U/L 83   AST (SGOT) U/L 33*   ALT (SGPT) U/L 31     Results from last 7 days   Lab Units 01/12/22  0808 01/11/22  0541 01/10/22  0747 01/09/22  1142   CALCIUM mg/dL 9.1 9.2 9.2 10.1   ALBUMIN g/dL  --   --   --  3.00*   MAGNESIUM mg/dL  --   --  2.7* 3.0*   PHOSPHORUS mg/dL  --   --  4.7*  --      Results from last 7 days   Lab Units 01/13/22  0819 01/09/22  1246   PROCALCITONIN ng/mL 0.10  --    LACTATE mmol/L  --  1.5     COVID19   Date Value Ref Range Status   01/09/2022 Not Detected Not Detected - Ref. Range Final     No results found for: HGBA1C, POCGLU    CT Abdomen Pelvis Without Contrast  Narrative: CT ABDOMEN PELVIS WO CONTRAST-     HISTORY:  Suprapubic abdominal pain.      TECHNIQUE:  CT images of the abdomen and pelvis were obtained without  intravenous contrast. Reformatted images were reviewed. Radiation dose  reduction techniques were utilized, including automated exposure control  and exposure modulation based on body size.     COMPARISON:  CT abdomen and pelvis without contrast 04/19/2016     FINDINGS CT ABDOMEN/PELVIS: Heart size is normal. There is no  pericardial effusion. There is calcific coronary artery atherosclerosis.  There is mild bibasilar atelectasis and/or scarring. Pleural spaces are  clear.  The liver is normal in size. Layering hyperattenuation within the  gallbladder lumen is suggestive of biliary sludge and/or stones. There  are no pancreatic calcifications. There are calcified splenic  granulomata. Adrenal glands within normal limits. There are incompletely  assessed bilateral renal lesions, some of which measure greater than  simple fluid density. There is a tiny nonobstructing superior  right  renal stone. There is no hydronephrosis. There is extensive calcific  aortoiliac and branch vessel atherosclerosis.  There is a small hiatal hernia. There is colonic diverticulosis. There  is relatively diffuse colonic wall thickening with moderate pericolonic  fat stranding. There is also wall thickening and fat stranding involving  the rectum. The appendix is present and normal in size. There is new  small volume fluid, which is likely reactive. No discrete or drainable  fluid collection is identified. The bladder is well distended and within  normal limits. The uterus is present. There is a 7.6 cm right adnexal  cystic lesion, which has increased in size 5.0 cm in 2016.  There is multilevel degenerative disc disease.     Limitations: Evaluation of the solid parenchymal organs and vasculature  is limited due to lack of intravenous contrast.        Impression:    1.  Colonic diverticulosis with relatively diffuse corresponding colonic  wall thickening and moderate pericolonic fat stranding, which raises  concern for a nonspecific pan-proctocolitis with multifocal  diverticulitis considered less likely given the extent of inflammation.  Correlate for possible clostridium difficile. Small volume free fluid is  likely reactive. No discrete or drainable fluid collection is  identified.  2.  Increased size of a 7.6 cm right adnexal cystic lesion, which is  incompletely assessed. Recommend further evaluation with pelvic  ultrasound.                 This report was finalized on 1/9/2022 6:07 PM by Dr. Marti Parra M.D.     XR Chest 1 View  ONE VIEW PORTABLE CHEST     HISTORY: Dizziness. Hypertension.     FINDINGS: The lungs are well-expanded and clear except for a calcified  granuloma at the left base. The heart is borderline enlarged and there  is no acute disease or change from 09/10/2021.     This report was finalized on 1/9/2022 1:45 PM by Dr. Kyle Montes M.D.       Scheduled Medications  cholecalciferol,  1,000 Units, Oral, Daily  montelukast, 10 mg, Oral, Nightly  potassium chloride, 40 mEq, Oral, Daily  sodium chloride, 10 mL, Intravenous, Q12H  vancomycin, 125 mg, Oral, Q6H    Infusions   Diet  Diet Regular; Thin; GI Soft       Assessment/Plan     Active Hospital Problems    Diagnosis  POA   • **Clostridium difficile infection [A49.8]  Yes   • Other specified sepsis (HCC) [A41.89]  Yes   • Diarrhea of presumed infectious origin [R19.7]  Yes   • Suprapubic pain [R10.2]  Unknown   • Leukocytosis [D72.829]  Unknown   • Acidosis [E87.2]  Unknown      Resolved Hospital Problems    Diagnosis Date Resolved POA   • TRACY (acute kidney injury) (HCC) [N17.9] 01/12/2022 Yes   • Hyponatremia [E87.1] 01/12/2022 Unknown       76 y.o. female admitted with diarrhea secondsary to C diff colitis and TRACY.    C diff colitis-improving  -We will continue p.o. vancomycin    Leukocytosis/?leukemoid reaction to C diff infxn  -WBC still trending upward, not sure why as exam remains benign and she really does not have any other focal symptoms.  - Chest x-ray and urinalysis negative  - C. difficile appears to be improving  - We will check lower extremity Dopplers for completeness    Borderline hypotension- improved but will continue to hold home meds.     SCDs  Dispo: SNF placement pending.  Stable to go anytime      Pablito Perez MD  [facility]  01/13/22  17:20 EST

## 2022-01-13 NOTE — PLAN OF CARE
Goal Outcome Evaluation:              Outcome Summary: Pt on 1 L NC throughout night, SR-ST on monitor, VSS. New IV placed in R FA. Isolation maintained. PO vanc continued. Multiple liquid, brown BMs this shift. Skin care upheld. UA collected via straight cath, pt tolerated well. No c/o pain. Will CTM.

## 2022-01-13 NOTE — CASE MANAGEMENT/SOCIAL WORK
Continued Stay Note  [facility]     Patient Name: Gardenia Roper  MRN: [id number]  Today's Date: 1/13/2022    Admit Date: 1/9/2022     Discharge Plan     Row Name 01/13/22 0840       Plan    Plan Comments Received voicemail from son, Brent Feldman ( [phone]).  He would like SNF referrals placed to [facility].  Referrals sent in [facility].  CCP will follow up later today. Paulina Moreland RN               Discharge Codes    No documentation.               Expected Discharge Date and Time     Expected Discharge Date Expected Discharge Time    Jan 14, 2022             Paulina Moreland RN

## 2022-01-13 NOTE — PLAN OF CARE
WBC remain elevated at 26.52. MD aware. Patient reporting more formed stools this shift and is regaining ability to control bowels. Up x 1 assist to the BSC. VSS. Tolerating diet. WCTM.             Problem: Adult Inpatient Plan of Care  Goal: Plan of Care Review  Outcome: Ongoing, Progressing  Flowsheets  Taken 1/13/2022 1552 by Areli Mejias, RN  Progress: improving  Taken 1/13/2022 1142 by Zehra Olivares PTA  Plan of Care Reviewed With: patient   Goal Outcome Evaluation:           Progress: improving

## 2022-01-13 NOTE — DISCHARGE PLACEMENT REQUEST
"Magnolia Roper (76 y.o. Female)             Date of Birth Social Security Number Address Home Phone MRN    1945  [address]   [facility] [id number] [phone] [id number]    Holiness Marital Status             Hindu        Admission Date Admission Type Admitting Provider Attending Provider Department, Room/Bed    1/9/22 Emergency Baldev Mcgovern MD [place], Matthew Brett, MD [address], [address]1    Discharge Date Discharge Disposition Discharge Destination                         Attending Provider: Pablito Perez MD    Allergies: No Known Allergies    Isolation: Spore   Infection: C.difficile (01/09/22)   Code Status: CPR   Advance Care Planning Activity    Ht: 167.6 cm (66\")   Wt: 80 kg (176 lb 5.9 oz)    Admission Cmt: None   Principal Problem: Clostridium difficile infection [A49.8]                 Active Insurance as of 1/9/2022     Primary Coverage     Payor Plan Insurance Group Employer/Plan Group    HUMANA MEDICARE REPLACEMENT HUMANA MEDICARE REPLACEMENT [id number]     Payor Plan Address Payor Plan Phone Number Payor Plan Fax Number Effective Dates    [address] [address] [phone]  1/1/2018 - None Entered    [facility] [id number]       Subscriber Name Subscriber Birth Date Member ID       MAGNOLIA ROPER 1945 [id number]                 Emergency Contacts      (Rel.) Home Phone Work Phone Mobile Phone    Brent Roper (Son) [phone] -- [phone]              "

## 2022-01-13 NOTE — PLAN OF CARE
Goal Outcome Evaluation:  Plan of Care Reviewed With: patient        Progress: improving  Outcome Summary: Pt agreed to PT session, pt agreed to try rwx and ensured pt that once she is stronger and improves endurance and improves wt shift she will be able to try w/o AD again; pt was in full agreement that she improved and felt steadier w/less assist using rwx; plans DC to SNU short term as she lives alone    Patient was wearing a face mask during this therapy encounter. Therapist used appropriate personal protective equipment including eye protection, mask, and gloves.  Mask used was standard procedure mask. Appropriate PPE was worn during the entire therapy session. Hand hygiene was completed before and after therapy session. Patient is not in enhanced droplet precautions.      Carlos Tyson, PT Tech present

## 2022-01-14 VITALS
BODY MASS INDEX: 28.52 KG/M2 | WEIGHT: 177.47 LBS | HEART RATE: 103 BPM | RESPIRATION RATE: 18 BRPM | HEIGHT: 66 IN | DIASTOLIC BLOOD PRESSURE: 97 MMHG | SYSTOLIC BLOOD PRESSURE: 144 MMHG | OXYGEN SATURATION: 90 % | TEMPERATURE: 97.8 F

## 2022-01-14 PROBLEM — A41.89 OTHER SPECIFIED SEPSIS: Status: RESOLVED | Noted: 2022-01-13 | Resolved: 2022-01-14

## 2022-01-14 PROBLEM — R10.2 SUPRAPUBIC PAIN: Status: RESOLVED | Noted: 2022-01-09 | Resolved: 2022-01-14

## 2022-01-14 LAB
BACTERIA SPEC AEROBE CULT: NORMAL
BACTERIA SPEC AEROBE CULT: NORMAL
CRP SERPL-MCNC: 1.84 MG/DL (ref 0–0.5)
DEPRECATED RDW RBC AUTO: 42.7 FL (ref 37–54)
ERYTHROCYTE [DISTWIDTH] IN BLOOD BY AUTOMATED COUNT: 12.9 % (ref 12.3–15.4)
HCT VFR BLD AUTO: 41.8 % (ref 34–46.6)
HGB BLD-MCNC: 13.7 G/DL (ref 12–15.9)
MCH RBC QN AUTO: 29.8 PG (ref 26.6–33)
MCHC RBC AUTO-ENTMCNC: 32.8 G/DL (ref 31.5–35.7)
MCV RBC AUTO: 91.1 FL (ref 79–97)
PLATELET # BLD AUTO: 207 10*3/MM3 (ref 140–450)
PMV BLD AUTO: 10.3 FL (ref 6–12)
RBC # BLD AUTO: 4.59 10*6/MM3 (ref 3.77–5.28)
WBC NRBC COR # BLD: 21.51 10*3/MM3 (ref 3.4–10.8)

## 2022-01-14 PROCEDURE — 97110 THERAPEUTIC EXERCISES: CPT

## 2022-01-14 PROCEDURE — 86140 C-REACTIVE PROTEIN: CPT | Performed by: HOSPITALIST

## 2022-01-14 PROCEDURE — 85027 COMPLETE CBC AUTOMATED: CPT | Performed by: HOSPITALIST

## 2022-01-14 PROCEDURE — 97535 SELF CARE MNGMENT TRAINING: CPT

## 2022-01-14 RX ORDER — VERAPAMIL HYDROCHLORIDE 240 MG/1
240 TABLET, FILM COATED, EXTENDED RELEASE ORAL
Status: DISCONTINUED | OUTPATIENT
Start: 2022-01-14 | End: 2022-01-14 | Stop reason: HOSPADM

## 2022-01-14 RX ORDER — VANCOMYCIN
125 KIT EVERY 6 HOURS SCHEDULED
Qty: 50 ML | Refills: 0
Start: 2022-01-14 | End: 2022-01-19

## 2022-01-14 RX ADMIN — VERAPAMIL HYDROCHLORIDE 240 MG: 240 TABLET, FILM COATED, EXTENDED RELEASE ORAL at 16:13

## 2022-01-14 RX ADMIN — ZINC OXIDE 1 APPLICATION: 200 OINTMENT TOPICAL at 13:09

## 2022-01-14 RX ADMIN — POTASSIUM CHLORIDE 40 MEQ: 750 TABLET, EXTENDED RELEASE ORAL at 08:21

## 2022-01-14 RX ADMIN — VANCOMYCIN HYDROCHLORIDE 125 MG: 125 CAPSULE ORAL at 06:00

## 2022-01-14 RX ADMIN — SODIUM CHLORIDE, PRESERVATIVE FREE 10 ML: 5 INJECTION INTRAVENOUS at 08:21

## 2022-01-14 RX ADMIN — Medication 1000 UNITS: at 08:21

## 2022-01-14 RX ADMIN — VANCOMYCIN HYDROCHLORIDE 125 MG: 125 CAPSULE ORAL at 13:09

## 2022-01-14 NOTE — CASE MANAGEMENT/SOCIAL WORK
Case Management Discharge Note      Final Note: DC to skilled bed at [facility] via private auto. Paulina Moreland RN    Provided Post Acute Provider List?: Yes  Post Acute Provider List: Home Health  Delivered To: Support Person  Support Person: Josef Kennedy  Method of Delivery: In person    Selected Continued Care - Discharged on 1/14/2022 Admission date: 1/9/2022 - Discharge disposition: Skilled Nursing Facility (DC - External)    Destination Coordination complete.    Service Provider Selected Services Address Phone Fax Patient Preferred    [facility]  Skilled Nursing [address] [place], [place] [address] [phone] --          Durable Medical Equipment    No services have been selected for the patient.              Dialysis/Infusion    No services have been selected for the patient.              Home Medical Care    No services have been selected for the patient.              Therapy    No services have been selected for the patient.              Community Resources    No services have been selected for the patient.              Community & DME    No services have been selected for the patient.                  Transportation Services  Private: Car    Final Discharge Disposition Code: 03 - skilled nursing facility (SNF)

## 2022-01-14 NOTE — PLAN OF CARE
Goal Outcome Evaluation:  Plan of Care Reviewed With: patient        Progress: improving  Outcome Summary: pt eager to walk today; up in chair, pt required 2 attempts for STS req min/CGA ; pt amb total of 50ft w/seated rest on bsc for BM; [place] at [place]; appropriate for SNU since pt will need to be indep to return home; will need to use AD initially for safety    Patient was wearing a face mask during this therapy encounter. Therapist used appropriate personal protective equipment including eye protection, mask, and gloves.  Mask used was standard procedure mask. Appropriate PPE was worn during the entire therapy session. Hand hygiene was completed before and after therapy session. Patient is not in enhanced droplet precautions.

## 2022-01-14 NOTE — PLAN OF CARE
Goal Outcome Evaluation:           Progress: no change  Outcome Summary: A&Ox4. Pt on RA. PO vanc continued. Up to BSC. Regular diet. Appetite fair. Worked with PT. Possible d/c today. VSS. Will continue to monitor.

## 2022-01-14 NOTE — THERAPY TREATMENT NOTE
Patient Name: Gardenia Roper  : 1945    MRN: [id number]                              Today's Date: 2022       Admit Date: 2022    Visit Dx:     ICD-10-CM ICD-9-CM   1. TRACY (acute kidney injury) (HCC)  N17.9 584.9   2. Hyponatremia  E87.1 276.1   3. Diarrhea, unspecified type  R19.7 787.91   4. Leukocytosis, unspecified type  D72.829 288.60   5. Transient hypotension  I95.9 796.3   6. Adnexal cyst  N94.9 625.8     Patient Active Problem List   Diagnosis   • Right ureteral stone   • Hypertension   • Hyperlipidemia   • Hypercalcemia   • Gastroesophageal reflux disease   • Atopic rhinitis   • Medicare annual wellness visit, subsequent   • Vitamin D deficiency   • Diarrhea of presumed infectious origin   • Leukocytosis   • Acidosis   • Clostridium difficile infection     Past Medical History:   Diagnosis Date   • Hyperlipidemia    • Hypertension    • Kidney stone on right side    • Osteoporosis    • Ovarian cyst      Past Surgical History:   Procedure Laterality Date   • ARTERIAL ANEURYSM REPAIR     • BRAIN SURGERY     • COSMETIC SURGERY     • OVARIAN CYST DRAINAGE/EXCISION     • OVARIAN CYST REMOVAL Left    • SINUS SURGERY     • URETEROSCOPY LASER LITHOTRIPSY WITH STENT INSERTION Right 2016    Procedure: CYSTO, RIGHT URETEROSCOPY , RIGHT STENT PLACEMENT, BILATERAL RETROGRADES;  Surgeon: Yuri Singh MD;  Location: [facility];  Service:    • URETEROSCOPY LASER LITHOTRIPSY WITH STENT INSERTION Right 2016    Procedure: RIGHT URETEROSCOPY LASER LITHOTRIPSY WITH STONE BASKET & STENT REMOVAL;  Surgeon: Yuri Singh MD;  Location: [facility];  Service:       General Information     Row Name 22 1235          OT Time and Intention    Document Type therapy note (daily note)  -MW     Mode of Treatment occupational therapy; individual therapy  -MW     Row Name 22 1235          General Information    Patient Profile Reviewed yes  -MW     Existing  Precautions/Restrictions fall  -     Row Name 01/14/22 1235          Cognition    Orientation Status (Cognition) oriented x 4  -     Row Name 01/14/22 1235          Safety Issues, Functional Mobility    Impairments Affecting Function (Mobility) balance; coordination; endurance/activity tolerance; strength  -     Comment, Safety Issues/Impairments (Mobility) gait belt and non skid socks donned  -           User Key  (r) = Recorded By, (t) = Taken By, (c) = Cosigned By    Initials Name Provider Type     Briana Bowens OT Occupational Therapist                 Mobility/ADL's     Row Name 01/14/22 1235          Bed Mobility    Bed Mobility supine-sit; scooting/bridging  -     Scooting/Bridging Ware (Bed Mobility) standby assist  -     Supine-Sit Ware (Bed Mobility) standby assist; verbal cues  -     Assistive Device (Bed Mobility) bed rails; head of bed elevated  -     Row Name 01/14/22 1235          Transfers    Bed-Chair Ware (Transfers) contact guard  -     Assistive Device (Bed-Chair Transfers) other (see comments)  no AD  -     Sit-Stand Ware (Transfers) contact guard  -     Row Name 01/14/22 123          Functional Mobility    Functional Mobility- Ind. Level contact guard assist  -[facility] Name 01/14/22 1235          Activities of Daily Living    BADL Assessment/Intervention grooming; lower body dressing; toileting  -[facility] Name 01/14/22 [facility]5          Lower Body Dressing Assessment/Training    Ware Level (Lower Body Dressing) doff; don; socks; supervision; verbal cues  -     Position (Lower Body Dressing) edge of bed sitting  -[facility] Name 01/14/22 1235          Grooming Assessment/Training    Ware Level (Grooming) wash face, hands; set up; verbal cues  -     Position (Grooming) edge of bed sitting  -[facility] Name 01/14/22 1235          Toileting Assessment/Training    Ware Level (Toileting) change pad/brief;  adjust/manage clothing; perform perineal hygiene; verbal cues  -MW     Assistive Devices (Toileting) commode, bedside without drop arms  -MW     Position (Toileting) supported standing; supported sitting  -MW     Comment (Toileting) pt required max a for posterior rossana care while maintaining static stand; mod A to don new brief in supported stand; CGA for SPS bed <> BSC transfer  -           User Key  (r) = Recorded By, (t) = Taken By, (c) = Cosigned By    Initials Name Provider Type    Briana Vargas OT Occupational Therapist               Obj/Interventions     Row Name 01/14/22 1237          Balance    Balance Assessment sitting static balance; sitting dynamic balance; sit to stand dynamic balance; standing static balance; standing dynamic balance  -     Static Sitting Balance WFL; sitting, edge of bed; unsupported  -MW     Dynamic Sitting Balance WFL; unsupported; sitting, edge of bed  -MW     Sit to Stand Dynamic Balance WFL  -MW     Static Standing Balance WFL; standing  -MW     Dynamic Standing Balance mild impairment; standing  -MW     Balance Interventions sitting; standing; supported; dynamic; static; sit to stand; occupation based/functional task  -     Comment, Balance CGA for SPS and steps to bedside chair with no AD  -           User Key  (r) = Recorded By, (t) = Taken By, (c) = Cosigned By    Initials Name Provider Type    Briana Vargas OT Occupational Therapist               Goals/Plan    No documentation.                Clinical Impression     Row Name 01/14/22 1238          Pain Assessment    Additional Documentation Pain Scale: Numbers Pre/Post-Treatment (Group)  -     Row Name 01/14/22 1238          Pain Scale: Numbers Pre/Post-Treatment    Pretreatment Pain Rating 0/10 - no pain  -MW     Posttreatment Pain Rating 0/10 - no pain  -MW     Row Name 01/14/22 1238          Plan of Care Review    Plan of Care Reviewed With patient  -MW     Progress improving  -MW     Outcome  Summary Pt seen for OT tx session this AM, A&Ox4, pleasant and agreeable. Upon arrival, pt stating she just had BM and required changing. Pt completed bed mob with SBA, completed STS and SPS onto BSC with CGA and no AD. Increased time on commode to void. Mod A to don new brief and max A for posterior rossana care while maintaining increased time in static stand with good static standing balance. Pt completed g/h w/ s/up and completed func mob to other side of room to chair with CGA. Pt continues to progress toward all stated goals.  -     Row Name 01/14/22 1238          Therapy Assessment/Plan (OT)    Therapy Frequency (OT) 5 times/wk  -     Row Name 01/14/22 1238          Therapy Plan Review/Discharge Plan (OT)    Anticipated Discharge Disposition (OT) skilled nursing facility  -     Row Name 01/14/22 1238          Vital Signs    O2 Delivery Pre Treatment room air  -MW     O2 Delivery Intra Treatment room air  -MW     O2 Delivery Post Treatment room air  -MW     Pre Patient Position Supine  -MW     Intra Patient Position Standing  -MW     Post Patient Position Sitting  -     Row Name 01/14/22 1238          Positioning and Restraints    Pre-Treatment Position in bed  -MW     Post Treatment Position chair  -MW     In Chair notified nsg; reclined; call light within reach; encouraged to call for assist; exit alarm on  -MW           User Key  (r) = Recorded By, (t) = Taken By, (c) = Cosigned By    Initials Name Provider Type    Briana Vargas, OT Occupational Therapist               Outcome Measures     Row Name 01/14/22 1240          How much help from another is currently needed...    Putting on and taking off regular lower body clothing? 3  -MW     Bathing (including washing, rinsing, and drying) 3  -MW     Toileting (which includes using toilet bed pan or urinal) 2  -MW     Putting on and taking off regular upper body clothing 3  -MW     Taking care of personal grooming (such as brushing teeth) 3  -MW      Eating meals 3  -MW     AM-PAC 6 Clicks Score (OT) 17  -MW     Row Name 01/14/22 1240          Functional Assessment    Outcome Measure Options AM-PAC 6 Clicks Daily Activity (OT)  -MW           User Key  (r) = Recorded By, (t) = Taken By, (c) = Cosigned By    Initials Name Provider Type     Briana Bowens OT Occupational Therapist                Occupational Therapy Education                 Title: PT OT SLP Therapies (In Progress)     Topic: Occupational Therapy (In Progress)     Point: ADL training (Done)     Description:   Instruct learner(s) on proper safety adaptation and remediation techniques during self care or transfers.   Instruct in proper use of assistive devices.              Learning Progress Summary           Patient Acceptance, E, VU by  at 1/11/2022 0920    Comment: The role of OT                   Point: Home exercise program (Not Started)     Description:   Instruct learner(s) on appropriate technique for monitoring, assisting and/or progressing therapeutic exercises/activities.              Learner Progress:  Not documented in this visit.          Point: Precautions (Not Started)     Description:   Instruct learner(s) on prescribed precautions during self-care and functional transfers.              Learner Progress:  Not documented in this visit.          Point: Body mechanics (Not Started)     Description:   Instruct learner(s) on proper positioning and spine alignment during self-care, functional mobility activities and/or exercises.              Learner Progress:  Not documented in this visit.                      User Key     Initials Effective Dates Name Provider Type [facility] 01/05/21 -  Denis Cazares OT Occupational Therapist OT              OT Recommendation and Plan  Therapy Frequency (OT): 5 times/wk  Plan of Care Review  Plan of Care Reviewed With: patient  Progress: improving  Outcome Summary: Pt seen for OT tx session this AM, A&Ox4, pleasant and agreeable. Upon  arrival, pt stating she just had BM and required changing. Pt completed bed mob with SBA, completed STS and SPS onto BSC with CGA and no AD. Increased time on commode to void. Mod A to don new brief and max A for posterior rossana care while maintaining increased time in static stand with good static standing balance. Pt completed g/h w/ s/up and completed func mob to other side of room to chair with CGA. Pt continues to progress toward all stated goals.     Time Calculation:    Time Calculation- OT     Row Name 01/14/22 1241             Time Calculation- OT    OT Start Time 0958  -MW      OT Stop Time 1024  -MW      OT Time Calculation (min) 26 min  -MW      Total Timed Code Minutes- OT 26 minute(s)  -MW      OT Received On 01/14/22  -MW      OT - Next Appointment 01/17/22  -MW              Timed Charges    62390 - OT Self Care/Mgmt Minutes 26  -MW              Total Minutes    Timed Charges Total Minutes 26  -MW       Total Minutes 26  -MW            User Key  (r) = Recorded By, (t) = Taken By, (c) = Cosigned By    Initials Name Provider Type    Briana Vargas OT Occupational Therapist              Therapy Charges for Today     Code Description Service Date Service Provider Modifiers Qty    23387650204 HC OT SELF CARE/MGMT/TRAIN EA 15 MIN 1/14/2022 Briana Bowens OT GO 2               Briana Bowens OT  1/14/2022

## 2022-01-14 NOTE — CASE MANAGEMENT/SOCIAL WORK
Continued Stay Note  [facility]     Patient Name: Gardenia Roper  MRN: [id number]  Today's Date: 1/14/2022    Admit Date: 1/9/2022     Discharge Plan     Row Name 01/14/22 3299       Plan    Plan Comments DC orders in EPIC.  Spoke with Gemma/[facility] and they can accept today. Trnasfer packet updated and dc summary faxed.  Spoke with son and he will transport today about 1600. Patient will dc to skilled bed at [facility] via [facility]. Paulina Moreland RN               Discharge Codes    No documentation.               Expected Discharge Date and Time     Expected Discharge Date Expected Discharge Time    Jan 14, 2022             Paulina Moreland RN

## 2022-01-14 NOTE — PLAN OF CARE
Goal Outcome Evaluation:  Plan of Care Reviewed With: patient        Progress: improving  Outcome Summary: Pt seen for OT tx session this AM, A&Ox4, pleasant and agreeable. Upon arrival, pt stating she just had BM and required changing. Pt completed bed mob with SBA, completed STS and SPS onto BSC with CGA and no AD. Increased time on commode to void. Mod A to don new brief and max A for posterior rossana care while maintaining increased time in static stand with good static standing balance. Pt completed g/h w/ s/up and completed func mob to other side of room to chair with CGA. Pt continues to progress toward all stated goals.    Therapist in mask, gloves, gown, glasses, and hand hygiene performed.

## 2022-01-14 NOTE — THERAPY TREATMENT NOTE
Patient Name: Gardenia Roper  : 1945    MRN: [id number]                              Today's Date: 2022       Admit Date: 2022    Visit Dx:     ICD-10-CM ICD-9-CM   1. TRACY (acute kidney injury) (HCC)  N17.9 584.9   2. Hyponatremia  E87.1 276.1   3. Diarrhea, unspecified type  R19.7 787.91   4. Leukocytosis, unspecified type  D72.829 288.60   5. Transient hypotension  I95.9 796.3   6. Adnexal cyst  N94.9 625.8     Patient Active Problem List   Diagnosis   • Right ureteral stone   • Hypertension   • Hyperlipidemia   • Hypercalcemia   • Gastroesophageal reflux disease   • Atopic rhinitis   • Medicare annual wellness visit, subsequent   • Vitamin D deficiency   • Diarrhea of presumed infectious origin   • Leukocytosis   • Acidosis   • Clostridium difficile infection     Past Medical History:   Diagnosis Date   • Hyperlipidemia    • Hypertension    • Kidney stone on right side    • Osteoporosis    • Ovarian cyst      Past Surgical History:   Procedure Laterality Date   • ARTERIAL ANEURYSM REPAIR     • BRAIN SURGERY     • COSMETIC SURGERY     • OVARIAN CYST DRAINAGE/EXCISION     • OVARIAN CYST REMOVAL Left    • SINUS SURGERY     • URETEROSCOPY LASER LITHOTRIPSY WITH STENT INSERTION Right 2016    Procedure: CYSTO, RIGHT URETEROSCOPY , RIGHT STENT PLACEMENT, BILATERAL RETROGRADES;  Surgeon: Yuri Singh MD;  Location: [facility];  Service:    • URETEROSCOPY LASER LITHOTRIPSY WITH STENT INSERTION Right 2016    Procedure: RIGHT URETEROSCOPY LASER LITHOTRIPSY WITH STONE BASKET & STENT REMOVAL;  Surgeon: Yuri Singh MD;  Location: [facility];  Service:       General Information     Row Name 22 1447          Physical Therapy Time and Intention    Document Type therapy note (daily note)  -EVITA     Mode of Treatment individual therapy; physical therapy  -     Row Name 22 1447          General Information    Patient Profile Reviewed yes  -EVITA     Existing  Precautions/Restrictions fall  -     Row Name 01/14/22 1447          Cognition    Orientation Status (Cognition) oriented x 3  -     Row Name 01/14/22 1447          Safety Issues, Functional Mobility    Impairments Affecting Function (Mobility) balance; coordination; endurance/activity tolerance; strength  -           User Key  (r) = Recorded By, (t) = Taken By, (c) = Cosigned By    Initials Name Provider Type    Zehra Lopez PTA Physical Therapy Assistant               Mobility     Row Name 01/14/22 1448          Bed Mobility    Comment (Bed Mobility) in chair  -     Row Name 01/14/22 1448          Transfers    Comment (Transfers) STS x2  -     Row Name 01/14/22 1448          Sit-Stand Transfer    Sit-Stand [place] (Transfers) contact guard; minimum assist (75% patient effort)  req 2 attempts on first walk, then CGA 1, 2nd walk  -     Assistive Device (Sit-Stand Transfers) walker, front-wheeled  -     Row Name 01/14/22 1448          Gait/Stairs (Locomotion)    [place] Level (Gait) contact guard; standby assist; verbal cues  -     Assistive Device (Gait) walker, front-wheeled  -     Distance in Feet (Gait) 20ft and 30ft, seated rest on bsc, cues for posture, no SOA noted  -     Deviations/Abnormal Patterns (Gait) guillermo decreased; stride length decreased  -     Bilateral Gait Deviations forward flexed posture  -           User Key  (r) = Recorded By, (t) = Taken By, (c) = Cosigned By    Initials Name Provider Type    Zehra Lopez PTA Physical Therapy Assistant               Obj/Interventions     Row Name 01/14/22 1455          Motor Skills    Therapeutic Exercise --  LAQs x10 reps  -           User Key  (r) = Recorded By, (t) = Taken By, (c) = Cosigned By    Initials Name Provider Type    Zehra Lopez PTA Physical Therapy Assistant               Goals/Plan    No documentation.                Clinical Impression     Row Name 01/14/22 1455          Pain  Scale: Numbers Pre/Post-Treatment    Pretreatment Pain Rating 0/10 - no pain  -     Posttreatment Pain Rating 0/10 - no pain  -     Row Name 01/14/22 1455          Plan of Care Review    Plan of Care Reviewed With patient  -     Outcome Summary pt eager to walk today; up in chair, pt required 2 attempts for STS req min/CGA ; pt amb total of 50ft w/seated rest on bsc for BM; pt plans SNU at [place]; appropriate for SNU since pt will need to be indep to return home; will need to use AD initially for safety  -     Row Name 01/14/22 1455          Therapy Assessment/Plan (PT)    Rehab Potential (PT) good, to achieve stated therapy goals  -     Criteria for Skilled Interventions Met (PT) yes  -     Row Name 01/14/22 1455          Vital Signs    O2 Delivery Pre Treatment room air  -     Row Name 01/14/22 1455          Positioning and Restraints    Pre-Treatment Position sitting in chair/recliner  -     Post Treatment Position chair  -     In Chair reclined; call light within reach; encouraged to call for assist; exit alarm on; notified nsg  -           User Key  (r) = Recorded By, (t) = Taken By, (c) = Cosigned By    Initials Name Provider Type    Zehra Lopez PTA Physical Therapy Assistant               Outcome Measures     Row Name 01/14/22 9121          How much help from another person do you currently need...    Moving from lying on back to sitting on the side of a flat bed without bedrails? 3  -JM     Moving to and from a bed to a chair (including a wheelchair)? 3  -JM     Standing up from a chair using your arms (e.g., wheelchair, bedside chair)? 3  -JM     Climbing 3-5 steps with a railing? 1  -JM     To walk in hospital room? 3  -     Row Name 01/14/22 1240          Functional Assessment    Outcome Measure Options AM-PAC 6 Clicks Daily Activity (OT)  -           User Key  (r) = Recorded By, (t) = Taken By, (c) = Cosigned By    Initials Name Provider Type    Zehra Lopez PTA  Physical Therapy Assistant    Briana Vargas OT Occupational Therapist                             Physical Therapy Education                 Title: PT OT SLP Therapies (In Progress)     Topic: Physical Therapy (Done)     Point: Mobility training (Done)     Learning Progress Summary           Patient Eager, E,D, VU by  at 1/14/2022 1501    Eager, E,TB,D, VU by  at 1/13/2022 1146    Acceptance, E,TB,D, VU,NR by CB at 1/12/2022 1306    Acceptance, E,D, NR by  at 1/11/2022 1900    Acceptance, E,TB,D, VU,NR by CB at 1/10/2022 1527                   Point: Home exercise program (Done)     Learning Progress Summary           Patient Eager, E,D, VU by  at 1/14/2022 1501    Eager, E,TB,D, VU by  at 1/13/2022 1146    Acceptance, E,D, NR by  at 1/11/2022 1900    Acceptance, E,TB,D, VU,NR by CB at 1/10/2022 1527                   Point: Body mechanics (Done)     Learning Progress Summary           Patient Eager, E,D, VU by EVITA at 1/14/2022 1501    Eager, E,TB,D, VU by  at 1/13/2022 1146    Acceptance, E,TB,D, VU,NR by CB at 1/12/2022 1306    Acceptance, E,D, NR by  at 1/11/2022 1900    Acceptance, E,TB,D, VU,NR by CB at 1/10/2022 1527                   Point: Precautions (Done)     Learning Progress Summary           Patient Eager, E,D, VU by EVITA at 1/14/2022 1501    Eager, E,TB,D, VU by  at 1/13/2022 1146    Acceptance, E,TB,D, VU,NR by CB at 1/12/2022 1306    Acceptance, E,D, NR by  at 1/11/2022 1900    Acceptance, E,TB,D, VU,NR by CB at 1/10/2022 1527                               User Key     Initials Effective Dates Name Provider Type Discipline     03/07/18 -  Zehra Olivares, PTA Physical Therapy Assistant PT    CB 10/22/21 -  Will, Ailin, PT Physical Therapist PT              PT Recommendation and Plan     Plan of Care Reviewed With: patient  Progress: improving  Outcome Summary: pt eager to walk today; up in chair, pt required 2 attempts for STS req min/CGA ; pt amb total of 50ft  w/seated rest on bsc for BM; pt plans SNU at [place]; appropriate for SNU since pt will need to be indep to return home; will need to use AD initially for safety     Time Calculation:     Therapy Charges for Today     Code Description Service Date Service Provider Modifiers Qty    46869695709 HC PT THER PROC EA 15 MIN 1/13/2022 Zehra Olivares, DESIRAE GP 2    49551809599 HC PT THER SUPP EA 15 MIN 1/13/2022 Zehra Olivares, DESIRAE GP 1    61567719535 HC PT THER PROC EA 15 MIN 1/14/2022 Zehra Olivares, DESIRAE GP 2          PT G-Codes  Outcome Measure Options: AM-PAC 6 Clicks Daily Activity (OT)  AM-PAC 6 Clicks Score (PT): 16  AM-PAC 6 Clicks Score (OT): 17  Modified Henderson Scale: 4 - Moderately severe disability.  Unable to walk without assistance, and unable to attend to own bodily needs without assistance.    Zehra Olivares PTA  1/14/2022

## 2022-01-14 NOTE — CASE MANAGEMENT/SOCIAL WORK
Continued Stay Note  [facility]     Patient Name: Gardenia Roper  MRN: [id number]  Today's Date: 1/14/2022    Admit Date: 1/9/2022     Discharge Plan     Row Name 01/14/22 1013       Plan    Plan [facility]- bed available over the weekend    Patient/Family in Agreement with Plan yes    Plan Comments Spoke with Marielama and she confirms that [facility] can accept over the weekend.  [facility] is waiving pre-cert at this time.  Spoke with son, Brent, and he confirmed that he spoke with [facility] and he is agreeable to [facility] if ready for dc over the weekend.  At dc, [facility] has requested that Vancomycin pills be changed to elixir.  Transfer packet in [place].  CCP will follow. Paulina Moreland RN               Discharge Codes    No documentation.               Expected Discharge Date and Time     Expected Discharge Date Expected Discharge Time    Huy 15, 2022             Paulina Moreland RN

## 2022-01-14 NOTE — PLAN OF CARE
Goal Outcome Evaluation:              Outcome Summary: Pt on RA to 1L throughout night. VSS. Up to BSC multiple times assist x1. Multiple brown, mucoid BMs. Good UO. Skin care upheld. Vancomycin continued. No c/o pain. Pt resting well. Will CTM.

## 2022-01-21 ENCOUNTER — READMISSION MANAGEMENT (OUTPATIENT)
Dept: CALL CENTER | Facility: HOSPITAL | Age: 77
End: 2022-01-21

## 2022-01-21 NOTE — OUTREACH NOTE
Prep Survey      Responses   [facility] facility patient discharged from? Non-BH   Is LACE score < 7 ? Non-BH Discharge   Emergency Room discharge w/ pulse ox? No   Eligibility [facility]   Date of Admission 01/14/22   Date of Discharge 01/21/22   Discharge diagnosis Clostridium difficile infection, sepsis, TRACY   Does the patient have one of the following disease processes/diagnoses(primary or secondary)? Other   Prep survey completed? Yes          Magda Painting RN

## 2022-01-24 ENCOUNTER — HOME HEALTH ADMISSION (OUTPATIENT)
Dept: HOME HEALTH SERVICES | Facility: HOME HEALTHCARE | Age: 77
End: 2022-01-24

## 2022-01-24 ENCOUNTER — TRANSITIONAL CARE MANAGEMENT TELEPHONE ENCOUNTER (OUTPATIENT)
Dept: CALL CENTER | Facility: HOSPITAL | Age: 77
End: 2022-01-24

## 2022-01-24 ENCOUNTER — TELEPHONE (OUTPATIENT)
Dept: INTERNAL MEDICINE | Facility: CLINIC | Age: 77
End: 2022-01-24

## 2022-01-24 NOTE — TELEPHONE ENCOUNTER
Caller: Brent Roper    Relationship to patient: Emergency Contact    Best call back number: [phone]    Chief complaint: RELEASED FROM [place] [facility] LAST WEEK, PATIENT CANCELED APPT FOR THIS WEEK BUT SON STATES SHE NEEDS TO COME IN. LEG SWELLING AND WEEPING.    Type of visit: HOSPITAL FOLLOW UP    Requested date: ASAP     Additional notes: UNABLE TO WARM TRANSFER, PLEASE CALL TO SCHEDULE. NO OPENINGS WITH DR WALKER.

## 2022-01-24 NOTE — OUTREACH NOTE
Call Center TCM Note      Responses   [facility] patient discharged from? Non-BH   Does the patient have one of the following disease processes/diagnoses(primary or secondary)? Other   TCM attempt successful? No   Unsuccessful attempts Attempt 1   Does the patient have a primary care provider?  Yes   Does the patient have an appointment with their PCP within 7 days of discharge? Yes   Comments regarding PCP hospital fu appt on 1/26/22 at 1:00 PM   Has the patient kept scheduled appointments due by today? N/A          Lisa Mac RN    1/24/2022, 10:32 EST

## 2022-01-24 NOTE — OUTREACH NOTE
Call Center TCM Note      Responses   [facility] patient discharged from? Non-   Does the patient have one of the following disease processes/diagnoses(primary or secondary)? Other   TCM attempt successful? Yes   Call end time 1458   Discharge diagnosis Clostridium difficile infection, sepsis, TRACY   Person spoke with today (if not patient) and relationship Patient   Meds reviewed with patient/caregiver? Yes   Is the patient having any side effects they believe may be caused by any medication additions or changes? No   Does the patient have all medications ordered at discharge? Yes   Is the patient taking all medications as directed (includes completed medication regime)? Yes   Does the patient have a primary care provider?  Yes   Does the patient have an appointment with their PCP within 7 days of discharge? Yes   Comments regarding PCP [facility] fu appt on 1/26/22 at 3:30 PM   Has the patient kept scheduled appointments due by today? N/A   Psychosocial issues? No   Did the patient receive a copy of their discharge instructions? Yes   Nursing interventions Reviewed instructions with patient   What is the patient's perception of their health status since discharge? Improving   Is the patient/caregiver able to teach back signs and symptoms related to disease process for when to call PCP? Yes   Is the patient/caregiver able to teach back signs and symptoms related to disease process for when to call 911? Yes   Is the patient/caregiver able to teach back the hierarchy of who to call/visit for symptoms/problems? PCP, Specialist, Home health nurse, Urgent Care, ED, 911 Yes   If the patient is a current smoker, are they able to teach back resources for cessation? Not a smoker   TCM call completed? Yes   Wrap up additional comments Pt states she is doing better. Pt verified [facility] fu appt on 1/26/22. Questions/concerns addressed.          Lisa Mac RN    1/24/2022, 15:00 EST

## 2022-01-26 ENCOUNTER — OFFICE VISIT (OUTPATIENT)
Dept: INTERNAL MEDICINE | Facility: CLINIC | Age: 77
End: 2022-01-26

## 2022-01-26 VITALS
TEMPERATURE: 97.7 F | WEIGHT: 167 LBS | DIASTOLIC BLOOD PRESSURE: 78 MMHG | SYSTOLIC BLOOD PRESSURE: 128 MMHG | BODY MASS INDEX: 26.84 KG/M2 | OXYGEN SATURATION: 98 % | HEART RATE: 74 BPM | HEIGHT: 66 IN

## 2022-01-26 DIAGNOSIS — Z09 HOSPITAL DISCHARGE FOLLOW-UP: ICD-10-CM

## 2022-01-26 DIAGNOSIS — E87.1 HYPONATREMIA: Primary | ICD-10-CM

## 2022-01-26 DIAGNOSIS — A49.8 CLOSTRIDIUM DIFFICILE INFECTION: ICD-10-CM

## 2022-01-26 PROCEDURE — 1111F DSCHRG MED/CURRENT MED MERGE: CPT | Performed by: NURSE PRACTITIONER

## 2022-01-26 PROCEDURE — 99495 TRANSJ CARE MGMT MOD F2F 14D: CPT | Performed by: NURSE PRACTITIONER

## 2022-01-26 RX ORDER — VALSARTAN AND HYDROCHLOROTHIAZIDE 80; 12.5 MG/1; MG/1
TABLET, FILM COATED ORAL
COMMUNITY
Start: 2021-11-08 | End: 2022-05-06

## 2022-01-27 ENCOUNTER — TELEPHONE (OUTPATIENT)
Dept: INTERNAL MEDICINE | Facility: CLINIC | Age: 77
End: 2022-01-27

## 2022-01-27 ENCOUNTER — PATIENT OUTREACH (OUTPATIENT)
Dept: CASE MANAGEMENT | Facility: OTHER | Age: 77
End: 2022-01-27

## 2022-01-27 LAB
ALBUMIN SERPL-MCNC: 3.8 G/DL (ref 3.7–4.7)
ALBUMIN/GLOB SERPL: 1.8 {RATIO} (ref 1.2–2.2)
ALP SERPL-CCNC: 56 IU/L (ref 44–121)
ALT SERPL-CCNC: 25 IU/L (ref 0–32)
AST SERPL-CCNC: 29 IU/L (ref 0–40)
BASOPHILS # BLD AUTO: 0.1 X10E3/UL (ref 0–0.2)
BASOPHILS NFR BLD AUTO: 1 %
BILIRUB SERPL-MCNC: 0.8 MG/DL (ref 0–1.2)
BUN SERPL-MCNC: 8 MG/DL (ref 8–27)
BUN/CREAT SERPL: 10 (ref 12–28)
CALCIUM SERPL-MCNC: 10.9 MG/DL (ref 8.7–10.3)
CHLORIDE SERPL-SCNC: 104 MMOL/L (ref 96–106)
CO2 SERPL-SCNC: 26 MMOL/L (ref 20–29)
CREAT SERPL-MCNC: 0.79 MG/DL (ref 0.57–1)
EOSINOPHIL # BLD AUTO: 0.1 X10E3/UL (ref 0–0.4)
EOSINOPHIL NFR BLD AUTO: 2 %
ERYTHROCYTE [DISTWIDTH] IN BLOOD BY AUTOMATED COUNT: 13.1 % (ref 11.7–15.4)
GLOBULIN SER CALC-MCNC: 2.1 G/DL (ref 1.5–4.5)
GLUCOSE SERPL-MCNC: 89 MG/DL (ref 65–99)
HCT VFR BLD AUTO: 35.9 % (ref 34–46.6)
HGB BLD-MCNC: 12.1 G/DL (ref 11.1–15.9)
IMM GRANULOCYTES # BLD AUTO: 0 X10E3/UL (ref 0–0.1)
IMM GRANULOCYTES NFR BLD AUTO: 0 %
LYMPHOCYTES # BLD AUTO: 1.4 X10E3/UL (ref 0.7–3.1)
LYMPHOCYTES NFR BLD AUTO: 26 %
MCH RBC QN AUTO: 30.3 PG (ref 26.6–33)
MCHC RBC AUTO-ENTMCNC: 33.7 G/DL (ref 31.5–35.7)
MCV RBC AUTO: 90 FL (ref 79–97)
MONOCYTES # BLD AUTO: 0.7 X10E3/UL (ref 0.1–0.9)
MONOCYTES NFR BLD AUTO: 13 %
NEUTROPHILS # BLD AUTO: 3.1 X10E3/UL (ref 1.4–7)
NEUTROPHILS NFR BLD AUTO: 58 %
PLATELET # BLD AUTO: 217 X10E3/UL (ref 150–450)
POTASSIUM SERPL-SCNC: 3.2 MMOL/L (ref 3.5–5.2)
PROT SERPL-MCNC: 5.9 G/DL (ref 6–8.5)
RBC # BLD AUTO: 4 X10E6/UL (ref 3.77–5.28)
SODIUM SERPL-SCNC: 142 MMOL/L (ref 134–144)
WBC # BLD AUTO: 5.5 X10E3/UL (ref 3.4–10.8)

## 2022-01-27 NOTE — OUTREACH NOTE
"Ambulatory Case Management Note    General & Health Literacy Assessment    Questions/Answers      Most Recent Value   Assessment Completed With Patient   Living Arrangement Alone   Type of Residence Private Residence   Home Care Services Yes   Difficulty Keeping Appointments No      SDOH updated and reviewed with the patient during this program:      Patient Outreach    Contacted for follow up related to discharge from recent rehab stay at [facility] on 1/21/22 with transition to home with the support of [facility]  Services. Patient states [facility] has called her today & made an appointment to see her today for their initial assessment. Patient reports stool is a soft consistency now & she is still taking her Vancomycin. A [employer] pharmacist has called her and reviewed all of her current medications with her(talked 1.5 hrs) & she also saw her ARNP at her PCP office on 1/26/22. Education done on notifying her PCP if the diarrhea returns again, hydration needs & symptoms of dehydration and symptoms of UTI. Patient plans to increase her water intake to 32 ounces today and attempt to increase this amount weekly. Explained role of Ambulatory  and contact information given to patient.Nurse provided patient education.No other questions, concerns or needs regarding health and wellness voiced at this time. Patient is agreeable to outreach \"after home health has finished\"   AWV is scheduled for 3/1/22.     Pilar Perez RN  Ambulatory Case Management    1/27/2022, 11:22 EST    "

## 2022-01-27 NOTE — TELEPHONE ENCOUNTER
Caller: Brent Roper    Relationship: Emergency Contact    Best call back number: [phone]    What is the best time to reach you: ANYTIME    Who are you requesting to speak with (clinical staff, provider,  specific staff member): MA OR DOCTOR     What was the call regarding: ANYTIME    Do you require a callback: PATIENTS SON STATES PATIENT NEEDS TO SCHEDULE ULTRA SOUND OF PELVIS

## 2022-01-29 DIAGNOSIS — E87.6 HYPOKALEMIA: Primary | ICD-10-CM

## 2022-01-29 RX ORDER — POTASSIUM CHLORIDE 20 MEQ/1
20 TABLET, EXTENDED RELEASE ORAL 2 TIMES DAILY
Qty: 4 TABLET | Refills: 0 | Status: SHIPPED | OUTPATIENT
Start: 2022-01-29 | End: 2022-01-31

## 2022-01-31 RX ORDER — POTASSIUM CHLORIDE 20 MEQ/1
TABLET, EXTENDED RELEASE ORAL
Qty: 4 TABLET | Refills: 0 | Status: SHIPPED | OUTPATIENT
Start: 2022-01-31

## 2022-01-31 RX ORDER — POTASSIUM CHLORIDE 20 MEQ/1
TABLET, EXTENDED RELEASE ORAL
Qty: 4 TABLET | Refills: 0 | OUTPATIENT
Start: 2022-01-31

## 2022-02-09 DIAGNOSIS — N94.9 ADNEXAL CYST: Primary | ICD-10-CM

## 2022-02-11 ENCOUNTER — HOSPITAL ENCOUNTER (OUTPATIENT)
Dept: ULTRASOUND IMAGING | Facility: HOSPITAL | Age: 77
End: 2022-02-11

## 2022-02-11 ENCOUNTER — HOSPITAL ENCOUNTER (OUTPATIENT)
Dept: ULTRASOUND IMAGING | Facility: HOSPITAL | Age: 77
Discharge: HOME | End: 2022-02-11
Admitting: FAMILY MEDICINE

## 2022-02-11 DIAGNOSIS — N94.9 ADNEXAL CYST: ICD-10-CM

## 2022-02-11 PROCEDURE — 76856 US EXAM PELVIC COMPLETE: CPT

## 2022-02-11 PROCEDURE — 76830 TRANSVAGINAL US NON-OB: CPT

## 2022-02-25 ENCOUNTER — PATIENT OUTREACH (OUTPATIENT)
Dept: CASE MANAGEMENT | Facility: OTHER | Age: 77
End: 2022-02-25

## 2022-02-25 NOTE — OUTREACH NOTE
Patient Outreach    Ambulatory Case Management Note    Call placed to pt for monthly follow up. She states she is doing much better. She was discharged from home health last week. She is back to driving. Pt does live alone but states she is able to manage household well.   Discussed C-diff and causes. Pt states she is afraid to take another antibiotic. Explained need for antibiotics and how they work on intestinal tract. Suggest she discuss probiotic with MD if ever prescribed and explained how these work. She states she has finished her vancomycin.   Pt states her children are very helpful when she needs assistance. She also has a new great grandson that she was eager to talk about. Very enjoyable conversation. Pt is agreeable to continued calls.         Edith Jules RN  Ambulatory Case Management    2/25/2022, 11:04 EST

## 2022-03-01 ENCOUNTER — OFFICE VISIT (OUTPATIENT)
Dept: INTERNAL MEDICINE | Facility: CLINIC | Age: 77
End: 2022-03-01

## 2022-03-01 ENCOUNTER — CLINICAL SUPPORT (OUTPATIENT)
Dept: INTERNAL MEDICINE | Facility: CLINIC | Age: 77
End: 2022-03-01

## 2022-03-01 VITALS
BODY MASS INDEX: 27.16 KG/M2 | HEIGHT: 66 IN | OXYGEN SATURATION: 96 % | SYSTOLIC BLOOD PRESSURE: 119 MMHG | WEIGHT: 169 LBS | DIASTOLIC BLOOD PRESSURE: 70 MMHG | TEMPERATURE: 98 F | HEART RATE: 92 BPM

## 2022-03-01 DIAGNOSIS — N94.89 SIMPLE ADNEXAL CYST GREATER THAN 1 CM IN DIAMETER IN POSTMENOPAUSAL PATIENT: ICD-10-CM

## 2022-03-01 DIAGNOSIS — Z86.19 HISTORY OF CLOSTRIDIOIDES DIFFICILE COLITIS: ICD-10-CM

## 2022-03-01 DIAGNOSIS — I10 PRIMARY HYPERTENSION: ICD-10-CM

## 2022-03-01 DIAGNOSIS — M81.0 AGE-RELATED OSTEOPOROSIS WITHOUT CURRENT PATHOLOGICAL FRACTURE: ICD-10-CM

## 2022-03-01 DIAGNOSIS — N95.8 SIMPLE ADNEXAL CYST GREATER THAN 1 CM IN DIAMETER IN POSTMENOPAUSAL PATIENT: ICD-10-CM

## 2022-03-01 DIAGNOSIS — Z78.0 OSTEOPENIA AFTER MENOPAUSE: ICD-10-CM

## 2022-03-01 DIAGNOSIS — E78.2 MIXED HYPERLIPIDEMIA: Primary | ICD-10-CM

## 2022-03-01 DIAGNOSIS — M85.80 OSTEOPENIA AFTER MENOPAUSE: ICD-10-CM

## 2022-03-01 DIAGNOSIS — Z00.00 MEDICARE ANNUAL WELLNESS VISIT, SUBSEQUENT: ICD-10-CM

## 2022-03-01 DIAGNOSIS — J30.1 ALLERGIC RHINITIS DUE TO POLLEN, UNSPECIFIED SEASONALITY: ICD-10-CM

## 2022-03-01 DIAGNOSIS — R42 DIZZINESS: ICD-10-CM

## 2022-03-01 PROCEDURE — G0439 PPPS, SUBSEQ VISIT: HCPCS | Performed by: FAMILY MEDICINE

## 2022-03-01 PROCEDURE — 1159F MED LIST DOCD IN RCRD: CPT | Performed by: FAMILY MEDICINE

## 2022-03-01 PROCEDURE — 1170F FXNL STATUS ASSESSED: CPT | Performed by: FAMILY MEDICINE

## 2022-03-01 PROCEDURE — 77080 DXA BONE DENSITY AXIAL: CPT | Performed by: FAMILY MEDICINE

## 2022-03-01 PROCEDURE — 99214 OFFICE O/P EST MOD 30 MIN: CPT | Performed by: FAMILY MEDICINE

## 2022-03-01 PROCEDURE — 96160 PT-FOCUSED HLTH RISK ASSMT: CPT | Performed by: FAMILY MEDICINE

## 2022-03-01 RX ORDER — METHYLPREDNISOLONE 4 MG/1
TABLET ORAL
Qty: 21 TABLET | Refills: 0 | Status: SHIPPED | OUTPATIENT
Start: 2022-03-01 | End: 2022-11-28

## 2022-03-01 NOTE — PROGRESS NOTES
"Chief Complaint  Medicare Wellness-subsequent    Subjective          Gardenia Roper presents to [facility]  Patient is here for Medicare wellness and also recheck active management hypertension hyperlipidemia.  She felt 3 September with multiple contusions and abrasion was given antibiotics after the fourth notably January 5 she developed C. difficile colitis with a hospitalization subsequently had to go to the nursing home/rehab.  She is overall doing better but complains of a \"sinus infection\".  She appears to have some lightheadedness and a flare of allergic rhinitis.  Have cautioned her about using antibiotics and will try Medrol Dosepak.    Otherwise DEXA scan is reviewed with her showing for the most part osteopenia.      Objective   Vital Signs:   /70   Pulse 92   Temp 98 °F (36.7 °C) (Temporal)   Ht 167.6 cm (65.98\")   Wt 76.7 kg (169 lb)   SpO2 96%   BMI 27.29 kg/m²     Physical Exam  Vitals reviewed.   Constitutional:       Appearance: She is well-developed.   HENT:      Head: Normocephalic and atraumatic.      Right Ear: Tympanic membrane and external ear normal.      Left Ear: Tympanic membrane and external ear normal.      Nose: Congestion present.   Eyes:      Conjunctiva/sclera: Conjunctivae normal.      Pupils: Pupils are equal, round, and reactive to light.   Neck:      Thyroid: No thyromegaly.      Vascular: No JVD.   Cardiovascular:      Rate and Rhythm: Normal rate and regular rhythm.      Heart sounds: Normal heart sounds.   Pulmonary:      Effort: Pulmonary effort is normal.      Breath sounds: Normal breath sounds.   Abdominal:      General: Bowel sounds are normal.      Palpations: Abdomen is soft.   Musculoskeletal:         General: Normal range of motion.      Cervical back: Normal range of motion and neck supple.      Right knee: Ecchymosis present.   Lymphadenopathy:      Cervical: No cervical adenopathy.   Skin:     General: Skin is warm and " dry.      Findings: No rash.   Neurological:      Mental Status: She is alert and oriented to person, place, and time.      Cranial Nerves: No cranial nerve deficit.      Coordination: Coordination normal.   Psychiatric:         Behavior: Behavior normal.         Thought Content: Thought content normal.         Judgment: Judgment normal.        Result Review :   The following data was reviewed by: Jim Hampton MD on 03/01/2022:  Common labs    Common Labsle 1/13/22 1/14/22 1/26/22 1/26/22      0422 0422   Glucose    89   BUN    8   Creatinine    0.79   eGFR Non  Am    72   eGFR African Am    83   Sodium    142   Potassium    3.2 (A)   Chloride    104   Calcium    10.9 (A)   Total Protein    5.9 (A)   Albumin    3.8   Total Bilirubin    0.8   Alkaline Phosphatase    56   AST (SGOT)    29   ALT (SGPT)    25   WBC 26.52 (A) 21.51 (A) 5.5    Hemoglobin 14.8 13.7 12.1    Hematocrit 44.9 41.8 35.9    Platelets 262 207 217    (A) Abnormal value       Comments are available for some flowsheets but are not being displayed.           Data reviewed: Recent hospitalization notes [facility]          Assessment and Plan    Diagnoses and all orders for this visit:    1. Mixed hyperlipidemia (Primary)  Comments:  Atorvastatin 20 mg daily    2. Primary hypertension  Comments:  Pravachol extended release to 40 mg daily valsartan hydrochlorothiazide 80/12.5 daily    3. History of Clostridioides difficile colitis  Comments:  Discussion of implications of prior C. difficile colitis.  Precautions with antibiotic treatment in the future.    4. Medicare annual wellness visit, subsequent    5. Allergic rhinitis due to pollen, unspecified seasonality  Comments:  Trial of Medrol Dosepak    6. Dizziness  Comments:  Medrol Dosepak    7. Simple adnexal cyst greater than 1 cm in diameter in postmenopausal patient  Comments:  Review of radiology reports concerning simple adnexal cyst.    Other orders  -     methylPREDNISolone  (MEDROL) 4 MG dose pack; Take as directed on package instructions.  Dispense: 21 tablet; Refill: 0        Follow Up   Return in about 6 months (around 9/1/2022) for Recheck.  Patient was given instructions and counseling regarding her condition or for health maintenance advice. Please see specific information pulled into the AVS if appropriate.

## 2022-03-01 NOTE — PATIENT INSTRUCTIONS
Medicare Wellness  Personal Prevention Plan of Service     Date of Office Visit:  2022  Encounter Provider:  Jim Hampton MD  Place of Service:  [facility]  Patient Name: Gardenia Roper  :  1945    As part of the Medicare Wellness portion of your visit today, we are providing you with this personalized preventive plan of services (PPPS). This plan is based upon recommendations of the [place] Preventive Services Task Force (USPSTF) and the Advisory Committee on Immunization Practices (ACIP).    This lists the preventive care services that should be considered, and provides dates of when you are due. Items listed as completed are up-to-date and do not require any further intervention.    Health Maintenance   Topic Date Due   • TDAP/TD VACCINES (1 - Tdap) Never done   • ZOSTER VACCINE (1 of 2) Never done   • HEPATITIS C SCREENING  Never done   • Pneumococcal Vaccine 65+ (2 of 2 - PPSV23) 2016   • ANNUAL WELLNESS VISIT  09/10/2020   • COLORECTAL CANCER SCREENING  2021   • LIPID PANEL  2022   • MAMMOGRAM  2023   • DXA SCAN  2024   • COVID-19 Vaccine  Completed   • INFLUENZA VACCINE  Completed       No orders of the defined types were placed in this encounter.      Return in about 6 months (around 2022) for Recheck.

## 2022-03-01 NOTE — PROGRESS NOTES
The ABCs of the Annual Wellness Visit  Subsequent Medicare Wellness Visit    Chief Complaint   Patient presents with   • Medicare Wellness-subsequent      Subjective    History of Present Illness:  Gardenia Roper is a 76 y.o. female who presents for a Subsequent Medicare Wellness Visit.    The following portions of the patient's history were reviewed and   updated as appropriate: allergies, current medications, past family history, past medical history, past social history, past surgical history and problem list.    Compared to one year ago, the patient feels her physical   health is worse.    Compared to one year ago, the patient feels her mental   health is the same.    Recent Hospitalizations:  This patient has had a [facility] [place] admission record on file within the last 365 days.    Current Medical Providers:  Patient Care Team:  Jim Hampton MD as PCP - General (Family Medicine)  Edil Little MD (Inactive) as Consulting Physician (Otolaryngology)  Asif Cazares MD as Consulting Physician (Allergy and Immunology)  Sonny Mejias MD as Consulting Physician (Ophthalmology)  Edith Jules RN as Ambulatory  (Population Health)    Outpatient Medications Prior to Visit   Medication Sig Dispense Refill   • atorvastatin (LIPITOR) 20 MG tablet Take 10 mg by mouth Daily.     • cholecalciferol (VITAMIN D3) 25 MCG (1000 UT) tablet Take 1,000 Units by mouth Daily.     • montelukast (SINGULAIR) 10 MG tablet Take 10 mg by mouth Every Night.     • Multiple Vitamins-Minerals (CENTRUM ADULTS PO) Take 1 tablet by mouth daily.     • oxymetazoline (AFRIN) 0.05 % nasal spray 2 sprays into the nostril(s) as directed by provider 2 (Two) Times a Day.     • potassium chloride (K-DUR,KLOR-CON) 20 MEQ CR tablet TAKE 1 TABLET BY MOUTH TWICE DAILY FOR 2 DAYS 4 tablet 0   • raloxifene (EVISTA) 60 MG tablet Take 60 mg by mouth Daily.     • Triamcinolone Acetonide (NASACORT) 55 MCG/ACT nasal inhaler 2 sprays into  "each nostril Daily.     • valsartan-hydrochlorothiazide (DIOVAN-HCT) 80-12.5 MG per tablet      • verapamil ER (VERELAN) 240 MG 24 hr capsule Take 240 mg by mouth Every Night.       No facility-administered medications prior to visit.       No opioid medication identified on active medication list. I have reviewed chart for other potential  high risk medication/s and harmful drug interactions in the elderly.          Aspirin is not on active medication list.  Aspirin use is not indicated based on review of current medical condition/s. Risk of harm outweighs potential benefits.  .    Patient Active Problem List   Diagnosis   • Right ureteral stone   • Hypertension   • Hyperlipidemia   • Hypercalcemia   • Gastroesophageal reflux disease   • Atopic rhinitis   • Medicare annual wellness visit, subsequent   • Vitamin D deficiency   • Diarrhea of presumed infectious origin   • Leukocytosis   • Acidosis   • Clostridium difficile infection     Advance Care Planning  Advance Directive is on file.  ACP discussion was held with the patient during this visit. Patient has an advance directive in EMR which is still valid.           Objective    Vitals:    22 1500   BP: 119/70   Pulse: 92   Temp: 98 °F (36.7 °C)   TempSrc: Temporal   SpO2: 96%   Weight: 76.7 kg (169 lb)   Height: 167.6 cm (65.98\")     BMI Readings from Last 1 Encounters:   22 27.29 kg/m²   BMI is above normal parameters. Recommendations include: none (medical contraindication)    Does the patient have evidence of cognitive impairment? No    Physical Exam            HEALTH RISK ASSESSMENT    Smoking Status:  Social History     Tobacco Use   Smoking Status Former Smoker   • Packs/day: 1.00   • Quit date: 1986   • Years since quittin.8   Smokeless Tobacco Never Used   Tobacco Comment    quit 25 years ago     Alcohol Consumption:  Social History     Substance and Sexual Activity   Alcohol Use Yes   • Alcohol/week: 1.0 standard drink   • Types: 1 " Glasses of wine per week    Comment: 2-3 times a week, one glass of wine, none in last 3-4 days     Fall Risk Screen:    ELSY Fall Risk Assessment was completed, and patient is at MODERATE risk for falls. Assessment completed on:3/1/2022    Depression Screening:  PHQ-2/PHQ-9 Depression Screening 3/1/2022   Little interest or pleasure in doing things 0   Feeling down, depressed, or hopeless 0   Trouble falling or staying asleep, or sleeping too much -   Feeling tired or having little energy -   Poor appetite or overeating -   Feeling bad about yourself - or that you are a failure or have let yourself or your family down -   Trouble concentrating on things, such as reading the newspaper or watching television -   Moving or speaking so slowly that other people could have noticed. Or the opposite - being so fidgety or restless that you have been moving around a lot more than usual -   Thoughts that you would be better off dead, or of hurting yourself in some way -   Total Score 0   If you checked off any problems, how difficult have these problems made it for you to do your work, take care of things at home, or get along with other people? -       Health Habits and Functional and Cognitive Screening:  Functional & Cognitive Status 3/1/2022   Do you have difficulty preparing food and eating? No   Do you have difficulty bathing yourself, getting dressed or grooming yourself? No   Do you have difficulty using the toilet? No   Do you have difficulty moving around from place to place? No   Do you have trouble with steps or getting out of a bed or a chair? No   Current Diet Well Balanced Diet   Dental Exam Not up to date   Eye Exam Up to date   Exercise (times per week) 4 times per week   Current Exercises Include Walking   Current Exercise Activities Include -   Do you need help using the phone?  No   Are you deaf or do you have serious difficulty hearing?  No   Do you need help with transportation? No   Do you need help  shopping? No   Do you need help preparing meals?  No   Do you need help with housework?  No   Do you need help with laundry? No   Do you need help taking your medications? No   Do you need help managing money? No   Do you ever drive or ride in a car without wearing a seat belt? No   Have you felt unusual stress, anger or loneliness in the last month? Yes   Who do you live with? Alone   If you need help, do you have trouble finding someone available to you? No   Have you been bothered in the last four weeks by sexual problems? No   Do you have difficulty concentrating, remembering or making decisions? No       Age-appropriate Screening Schedule:  Refer to the list below for future screening recommendations based on patient's age, sex and/or medical conditions. Orders for these recommended tests are listed in the plan section. The patient has been provided with a written plan.    Health Maintenance   Topic Date Due   • TDAP/TD VACCINES (1 - Tdap) Never done   • ZOSTER VACCINE (1 of 2) Never done   • LIPID PANEL  08/31/2022   • MAMMOGRAM  05/21/2023   • DXA SCAN  03/01/2024   • INFLUENZA VACCINE  Completed              Assessment/Plan   CMS Preventative Services Quick Reference  Risk Factors Identified During Encounter  Cardiovascular Disease  Fall Risk-High or Moderate  The above risks/problems have been discussed with the patient.  Follow up actions/plans if indicated are seen below in the Assessment/Plan Section.  Pertinent information has been shared with the patient in the After Visit Summary.    There are no diagnoses linked to this encounter.    Follow Up:   No follow-ups on file.     An After Visit Summary and PPPS were made available to the patient.

## 2022-03-30 ENCOUNTER — PATIENT OUTREACH (OUTPATIENT)
Dept: CASE MANAGEMENT | Facility: OTHER | Age: 77
End: 2022-03-30

## 2022-03-30 NOTE — OUTREACH NOTE
AMBULATORY CASE MANAGEMENT NOTE    Name and Relationship of Patient/Support Person:  -   Patient Outreach  Call placed to patient for monthly check in. Patient states she is doing well. Her sinuses are bothering her. Review of Cardiology visit AVS from recent appointment. Confirmed patient understood plan and had no questions. Discussed graduation with patient but she would like further calls in case of issues. She also has AC number if needed.      Adult Patient Profile  Questions/Answers    Flowsheet Row Most Recent Value   Symptoms/Conditions Managed at Home cardiovascular   Barriers to Managing Health none   Cardiovascular Symptoms/Conditions high blood cholesterol, hypertension   Cardiovascular Management Strategies diet modification, medication therapy   People in Home alone   Current Living Arrangements home        Send Education  Questions/Answers    Flowsheet Row Most Recent Value   Annual Wellness Visit:  Patient Has Completed   Advanced Directives: Patient Has          Education Documentation  Medication Management, taught by Edith Jules, RN at 3/30/2022  3:02 PM.  Learner: Patient  Readiness: Acceptance  Method: Explanation, Teach Back  Response: Verbalizes Understanding          EDITH GILMAN  Ambulatory Case Management    3/30/2022, 15:03 EDT

## 2022-05-03 ENCOUNTER — PATIENT OUTREACH (OUTPATIENT)
Dept: CASE MANAGEMENT | Facility: OTHER | Age: 77
End: 2022-05-03

## 2022-05-03 NOTE — OUTREACH NOTE
Patient Outreach    AMBULATORY CASE MANAGEMENT NOTE    Name and Relationship of Patient/Support Person: Gardenia Roper M - Self    Call placed to patient for her monthly check in. She states she is doing well other than continued allergy, congestion. She reports this as being bad every time the rain comes in. She also feels she is loosing her hair. Discussed that this could be a result of nutrients lost during her bout of c.diff. Discussed vitamins that may help with this but suggested she check with her PCP before starting. Denies any additional issues at this time. No other questions or concerns        FRACISCO GILMAN  Ambulatory Case Management    5/3/2022, 15:14 EDT

## 2022-05-06 RX ORDER — VALSARTAN AND HYDROCHLOROTHIAZIDE 80; 12.5 MG/1; MG/1
TABLET, FILM COATED ORAL
Qty: 90 TABLET | Refills: 1 | Status: SHIPPED | OUTPATIENT
Start: 2022-05-06 | End: 2022-10-31

## 2022-06-07 ENCOUNTER — TELEPHONE (OUTPATIENT)
Dept: INTERNAL MEDICINE | Facility: CLINIC | Age: 77
End: 2022-06-07

## 2022-06-07 NOTE — TELEPHONE ENCOUNTER
Caller: Gardenia Roper    Relationship: Self    Best call back number: [phone]    What is the best time to reach you: ANY    Who are you requesting to speak with (clinical staff, provider,  specific staff member): FRACISCO    Do you know the name of the person who called: FRACISCO    What was the call regarding: RETURNING CALL ABOUT HOW SHE WAS DOING, ALSO TO THANK FRACISCO FOR SUGGESTING BIOTIN.     Do you require a callback: YES

## 2022-06-08 ENCOUNTER — PATIENT OUTREACH (OUTPATIENT)
Dept: CASE MANAGEMENT | Facility: OTHER | Age: 77
End: 2022-06-08

## 2022-06-08 NOTE — OUTREACH NOTE
Patient Outreach    AMBULATORY CASE MANAGEMENT NOTE    Name and Relationship of Patient/Support Person: Gardenia Roper M - Self    Call placed to patient who states she is doing well. She did have sinus infection and was able to get her prescription filled.  Currently she is dealing with allergies. She does have nasocort for this and has been taking. Long enjoyable conversation. She denies any questions or concerns at this time but does have ACM number if needed.       Education Documentation  Self-Care, taught by Edith Jules, RN at 6/8/2022 12:44 PM.  Learner: Patient  Readiness: Acceptance  Method: Explanation  Response: Verbalizes Understanding          EDITH GILMAN  Ambulatory Case Management    6/8/2022, 12:45 EDT

## 2022-07-25 ENCOUNTER — PATIENT OUTREACH (OUTPATIENT)
Dept: CASE MANAGEMENT | Facility: OTHER | Age: 77
End: 2022-07-25

## 2022-07-25 NOTE — OUTREACH NOTE
Patient Outreach    Adult Patient Profile  Questions/Answers    Flowsheet Row Most Recent Value   Symptoms/Conditions Managed at Home musculoskeletal   Musculoskeletal Symptoms/Conditions mobility limited, frailty syndrome      AMBULATORY CASE MANAGEMENT NOTE    Name and Relationship of Patient/Support Person: JudsonGardenia M - Self    Call placed to patient for monthly check in. She states she is doing better. Her main issue at this time is muscle weakness. She feels her legs have remained weak ever since her fall and bout with C-diff. She states she has not been able to do much exercise due to weakness and pain. She has not been able to tolerate walking very much. Encouraged to start with a small goal and build up as her strength increases. Suggested a goal of standing/sitting during commercial breaks to increase strength in her legs. She is eager to try this. As always long enjoyable conversation.     Education Documentation  coping strategies, taught by Edith Jules, RN at 7/25/2022 11:13 AM.  Learner: Patient  Readiness: Eager  Method: Explanation  Response: Verbalizes Understanding    activity, taught by Edith Jules, RN at 7/25/2022 11:13 AM.  Learner: Patient  Readiness: Eager  Method: Explanation  Response: Verbalizes Understanding          EDITH GILMAN  Ambulatory Case Management    7/25/2022, 11:13 EDT

## 2022-08-24 ENCOUNTER — PATIENT OUTREACH (OUTPATIENT)
Dept: CASE MANAGEMENT | Facility: OTHER | Age: 77
End: 2022-08-24

## 2022-08-24 NOTE — OUTREACH NOTE
Patient Outreach    AMBULATORY CASE MANAGEMENT NOTE    Name and Relationship of Patient/Support Person:  -     Call placed to patient who states she is doing well. She has had her last cataract surgery. She reports feeling woozy still. She is going to wait a few more days to begin driving again.   Discussed activity goals. She was unable to start this month due to her cataract surgeries. She also reports her allergies have been bothering her.   She states she is still having pain with her knee. She has been using aspercreme but does not feel that is working. Discussed over the counter pain relievers and ointments. She will discuss with her PCP. She does have an appointment with him soon.  Review of health care gaps. She plans on getting her COVID booster soon. She felt she should not get this during her Month of August due to her eye surgeries.   Discussed graduation and she does not feel ready to do this and would like continued calls.     Education Documentation  coping strategies, taught by Edith Jules, RN at 8/24/2022 11:20 AM.  Learner: Patient  Readiness: Acceptance  Method: Explanation  Response: Verbalizes Understanding    activity, taught by Edith Jules, RN at 8/24/2022 11:20 AM.  Learner: Patient  Readiness: Acceptance  Method: Explanation  Response: Verbalizes Understanding    Provider Follow-Up, taught by Edith Jules, RN at 8/24/2022 11:20 AM.  Learner: Patient  Readiness: Acceptance  Method: Explanation  Response: Verbalizes Understanding          EDITH GILMAN  Ambulatory Case Management    8/24/2022, 11:20 EDT

## 2022-08-26 RX ORDER — MONTELUKAST SODIUM 10 MG/1
TABLET ORAL
Qty: 90 TABLET | Refills: 2 | Status: SHIPPED | OUTPATIENT
Start: 2022-08-26

## 2022-08-26 RX ORDER — ATORVASTATIN CALCIUM 20 MG/1
TABLET, FILM COATED ORAL
Qty: 45 TABLET | Refills: 2 | Status: SHIPPED | OUTPATIENT
Start: 2022-08-26

## 2022-08-26 RX ORDER — RALOXIFENE HYDROCHLORIDE 60 MG/1
TABLET, FILM COATED ORAL
Qty: 90 TABLET | Refills: 2 | Status: SHIPPED | OUTPATIENT
Start: 2022-08-26

## 2022-09-01 DIAGNOSIS — I10 PRIMARY HYPERTENSION: Primary | ICD-10-CM

## 2022-09-01 RX ORDER — VERAPAMIL HYDROCHLORIDE 240 MG/1
CAPSULE, EXTENDED RELEASE ORAL
Qty: 90 CAPSULE | Refills: 3 | Status: SHIPPED | OUTPATIENT
Start: 2022-09-01

## 2022-10-07 ENCOUNTER — PATIENT OUTREACH (OUTPATIENT)
Dept: CASE MANAGEMENT | Facility: OTHER | Age: 77
End: 2022-10-07

## 2022-10-07 NOTE — OUTREACH NOTE
Patient Outreach     Call placed to patient for monthly check in. Who states she is doing well. She recently went to her eye Dr and got final clearance from her cataract surgery. She also saw Dr due to hearing and was told she needs a hearing aid. [facility]iscussed [facility] and informed that they cover some of the cost. ACM not sure if this is financial based or would cover for her. Offered to give her information and she would like to wait and call ACM if it is needed.   Review of her blood pressure she has been told this is controlled well on her medication. She reports that the verapamil is expensive. ACM did look this up for her on [facility]. Cost there is 17$. She does have a smart phone. Suggested she have family load the raegan for her. She will check with her grandson. Denies any conerns at this time. Long enjoyable conversation.    AMBULATORY CASE MANAGEMENT NOTE    Name and Relationship of Patient/Support Person:   Mrs Roper        Education Documentation  Self-Care, taught by Edith Jules RN at 10/7/2022 11:17 AM.  Learner: Patient  Readiness: Eager  Method: Explanation  Response: Verbalizes Understanding    Provider Follow-Up, taught by Edith Jules RN at 10/7/2022 11:17 AM.  Learner: Patient  Readiness: Eager  Method: Explanation  Response: Verbalizes Understanding    Medication Management, taught by Edith Jules RN at 10/7/2022 11:17 AM.  Learner: Patient  Readiness: Eager  Method: Explanation  Response: Verbalizes Understanding    Blood Pressure Monitoring, taught by Edith Jules RN at 10/7/2022 11:17 AM.  Learner: Patient  Readiness: Eager  Method: Explanation  Response: Verbalizes Understanding    Risk Factors, taught by Edith Jules RN at 10/7/2022 11:17 AM.  Learner: Patient  Readiness: Eager  Method: Explanation  Response: Verbalizes Understanding    Description, taught by Edith Jules RN at 10/7/2022 11:17 AM.  Learner: Patient  Readiness: Eager  Method:  Explanation  Response: Verbalizes Understanding          FRACISCO GILMAN  Ambulatory Case Management    10/7/2022, 11:18 EDT

## 2022-10-31 RX ORDER — VALSARTAN AND HYDROCHLOROTHIAZIDE 80; 12.5 MG/1; MG/1
TABLET, FILM COATED ORAL
Qty: 90 TABLET | Refills: 0 | Status: SHIPPED | OUTPATIENT
Start: 2022-10-31 | End: 2023-02-06

## 2022-11-21 ENCOUNTER — PATIENT OUTREACH (OUTPATIENT)
Dept: CASE MANAGEMENT | Facility: OTHER | Age: 77
End: 2022-11-21

## 2022-11-21 NOTE — OUTREACH NOTE
Patient Outreach    AMBULATORY CASE MANAGEMENT NOTE    Name and Relationship of Patient/Support Person: Gardenia Roper M - Self    Call placed to patient for monthly check in. She states she is doing well.  She feels she may have some long term effects from c-diff. Discussed use of probiotics. Encouraged to increase her activity. Try to exercise a little every day and increase this weekly. Suggested looking up on line exercise.   She reports that she has felt weak and does not want to do anything. Long enjoyable conversation. Denies questions or concerns.         FRACISCO GILMAN  Ambulatory Case Management    11/21/2022, 10:46 EST

## 2022-11-23 NOTE — DISCHARGE SUMMARY
Patient Name: Gardenia Roper  : 1945  MRN: [id number]    Date of Admission: 2022  Date of Discharge:  2022  Primary Care Physician: Jim Hampton Jr., MD      Chief Complaint:   Diarrhea, Altered Mental Status, and Weakness - Generalized      Discharge Diagnoses     Active Hospital Problems    Diagnosis  POA   • **Clostridium difficile infection [A49.8]  Yes   • Diarrhea of presumed infectious origin [R19.7]  Yes   • Leukocytosis [D72.829]  Yes   • Acidosis [E87.2]  Unknown      Resolved Hospital Problems    Diagnosis Date Resolved POA   • Other specified sepsis (HCC) [A41.89] 2022 Yes   • TRACY (acute kidney injury) (HCC) [N17.9] 2022 Yes   • Suprapubic pain [R10.2] 2022 Yes   • Hyponatremia [E87.1] 2022 Yes        Hospital Course     Ms. Roper is a 76 y.o. female with a history of hypertension and hyperlipidemia who presented to [facility] initially complaining of diarrhea and generalized weakness.  Please see the admitting history and physical for further details.  A stool sample was sent and came back positive for C. difficile.  She had significant acute renal failure on admission which resolved with IV fluids.  We obviously held her antihypertensives which included valsartan.  We have been holding verapamil as well but her heart rate has started to creep upward so I am going to restart that today, but continue to hold the valsartan for the time being.  She was started on oral vancomycin and has had steady improvement with that regimen.  She still needs about 5 days worth of it but her stools are forming up and she appears to be safe for discharge as her oral intake is adequate.  She had some significant leukocytosis which has taken a while to start to trend downward but I did turn the corner today, decreasing from 26-21.  CRP is minimally up at 1.8.  Abdominal exam is benign.  She appears stable for discharge to skilled nursing facility for continued  physical rehab and to complete the remainder of her vancomycin course.    Of note, a pelvic ultrasound was recommended to follow-up on an adnexal cyst.  This should be followed up on the outpatient setting.  In fact I will go ahead and put an order in for it now so that it can be done sometime in the next few weeks.      Day of Discharge     Subjective:  Doing better.  Still frequent stools but semiformed now    Physical Exam:  Temp:  [96.2 °F (35.7 °C)-97.5 °F (36.4 °C)] 96.9 °F (36.1 °C)  Heart Rate:  [] 112  Resp:  [16-18] 18  BP: (118-135)/(68-78) 135/78  Body mass index is 28.64 kg/m².  Physical Exam  Vitals and nursing note reviewed.   Constitutional:       Appearance: She is not ill-appearing.   HENT:      Head: Normocephalic and atraumatic.   Eyes:      General: No scleral icterus.  Cardiovascular:      Rate and Rhythm: Normal rate and regular rhythm.      Heart sounds: No murmur heard.      Pulmonary:      Effort: No respiratory distress.      Breath sounds: Normal breath sounds.   Abdominal:      General: Bowel sounds are normal. There is no distension.      Palpations: Abdomen is soft.      Tenderness: There is no abdominal tenderness.   Musculoskeletal:      Right lower leg: No edema.      Left lower leg: No edema.   Skin:     General: Skin is warm and dry.      Coloration: Skin is not pale.   Neurological:      General: No focal deficit present.      Mental Status: She is alert and oriented to person, place, and time.      Motor: Weakness present.         Consultants     Consult Orders (all) (From admission, onward)     Start     Ordered    01/09/22 1640  Inpatient Case Management  Consult  Once        Provider:  (Not yet assigned)    01/09/22 1639    01/09/22 1227  [facility] (on-call MD unless specified) Details  Once        Specialty:  Hospitalist  Provider:  (Not yet assigned)    01/09/22 1226              Procedures       Imaging Results (All)     Procedure Component Value Units  Date/Time    CT Abdomen Pelvis Without Contrast [[id number]] Collected: 01/09/22 1758     Updated: 01/09/22 1810    Narrative:      CT ABDOMEN PELVIS WO CONTRAST-     HISTORY:  Suprapubic abdominal pain.      TECHNIQUE:  CT images of the abdomen and pelvis were obtained without  intravenous contrast. Reformatted images were reviewed. Radiation dose  reduction techniques were utilized, including automated exposure control  and exposure modulation based on body size.     COMPARISON:  CT abdomen and pelvis without contrast 04/19/2016     FINDINGS CT ABDOMEN/PELVIS: Heart size is normal. There is no  pericardial effusion. There is calcific coronary artery atherosclerosis.  There is mild bibasilar atelectasis and/or scarring. Pleural spaces are  clear.  The liver is normal in size. Layering hyperattenuation within the  gallbladder lumen is suggestive of biliary sludge and/or stones. There  are no pancreatic calcifications. There are calcified splenic  granulomata. Adrenal glands within normal limits. There are incompletely  assessed bilateral renal lesions, some of which measure greater than  simple fluid density. There is a tiny nonobstructing superior right  renal stone. There is no hydronephrosis. There is extensive calcific  aortoiliac and branch vessel atherosclerosis.  There is a small hiatal hernia. There is colonic diverticulosis. There  is relatively diffuse colonic wall thickening with moderate pericolonic  fat stranding. There is also wall thickening and fat stranding involving  the rectum. The appendix is present and normal in size. There is new  small volume fluid, which is likely reactive. No discrete or drainable  fluid collection is identified. The bladder is well distended and within  normal limits. The uterus is present. There is a 7.6 cm right adnexal  cystic lesion, which has increased in size 5.0 cm in 2016.  There is multilevel degenerative disc disease.     Limitations: Evaluation of the solid  parenchymal organs and vasculature  is limited due to lack of intravenous contrast.          Impression:         1.  Colonic diverticulosis with relatively diffuse corresponding colonic  wall thickening and moderate pericolonic fat stranding, which raises  concern for a nonspecific pan-proctocolitis with multifocal  diverticulitis considered less likely given the extent of inflammation.  Correlate for possible clostridium difficile. Small volume free fluid is  likely reactive. No discrete or drainable fluid collection is  identified.  2.  Increased size of a 7.6 cm right adnexal cystic lesion, which is  incompletely assessed. Recommend further evaluation with pelvic  ultrasound.                 This report was finalized on 1/9/2022 6:07 PM by Dr. Marti Parra M.D.       XR Chest 1 View [[id number]] Collected: 01/09/22 1147     Updated: 01/09/22 1348    Narrative:      ONE VIEW PORTABLE CHEST     HISTORY: Dizziness. Hypertension.     FINDINGS: The lungs are well-expanded and clear except for a calcified  granuloma at the left base. The heart is borderline enlarged and there  is no acute disease or change from 09/10/2021.     This report was finalized on 1/9/2022 1:45 PM by Dr. Kyle Montes M.D.           Results for orders placed during the hospital encounter of 01/09/22    Duplex Venous Lower Extremity - Bilateral CAR    Interpretation Summary  · Normal bilateral lower extremity venous duplex scan.      Pertinent Labs     Results from last 7 days   Lab Units 01/14/22  0647 01/13/22  0819 01/12/22  0808 01/11/22  0541   WBC 10*3/mm3 21.51* 26.52* 24.87* 22.58*   HEMOGLOBIN g/dL 13.7 14.8 13.9 13.4   PLATELETS 10*3/mm3 207 262 226 192     Results from last 7 days   Lab Units 01/12/22  0808 01/11/22  0541 01/10/22  0747 01/09/22  1142   SODIUM mmol/L 136 135* 134* 127*   POTASSIUM mmol/L 4.4 3.5 3.2* 3.6   CHLORIDE mmol/L 105 103 98 92*   CO2 mmol/L 24.2 25.0 22.7 19.7*   BUN mg/dL 19 39* 58* 73*   CREATININE mg/dL  0.68 1.12* 2.00* 3.63*   GLUCOSE mg/dL 95 82 63* 81   EGFR IF NONAFRICN AM mL/min/1.73 84 47* 24* 12*     Results from last 7 days   Lab Units 01/09/22  1142   ALBUMIN g/dL 3.00*   BILIRUBIN mg/dL 0.5   ALK PHOS U/L 83   AST (SGOT) U/L 33*   ALT (SGPT) U/L 31     Results from last 7 days   Lab Units 01/12/22  0808 01/11/22  0541 01/10/22  0747 01/09/22  1142   CALCIUM mg/dL 9.1 9.2 9.2 10.1   ALBUMIN g/dL  --   --   --  3.00*   MAGNESIUM mg/dL  --   --  2.7* 3.0*   PHOSPHORUS mg/dL  --   --  4.7*  --      Results from last 7 days   Lab Units 01/09/22  1142   LIPASE U/L 29     Results from last 7 days   Lab Units 01/09/22  1142   TROPONIN T ng/mL <0.010     Results from last 7 days   Lab Units 01/09/22  1142   URIC ACID mg/dL 12.0*         Invalid input(s): LDLCALC  Results from last 7 days   Lab Units 01/09/22  1246   BLOODCX  No growth at 4 days  No growth at 4 days     Results from last 7 days   Lab Units 01/09/22  1145   COVID19  Not Detected       Test Results Pending at Discharge     Pending Labs     Order Current Status    Blood Culture - Blood, Arm, Left Preliminary result    Blood Culture - Blood, Arm, Right Preliminary result          Discharge Details        Discharge Medications      New Medications      Instructions Start Date   vancomycin 50 MG/ML solution oral solution   125 mg, Oral, Every 6 Hours Scheduled         Continue These Medications      Instructions Start Date   atorvastatin 20 MG tablet  Commonly known as: LIPITOR   10 mg, Oral, Daily      cholecalciferol 25 MCG (1000 UT) tablet  Commonly known as: VITAMIN D3   1,000 Units, Oral, Daily      montelukast 10 MG tablet  Commonly known as: SINGULAIR   10 mg, Oral, Nightly      multivitamin with minerals tablet tablet   1 tablet, Oral, Daily      oxymetazoline 0.05 % nasal spray  Commonly known as: AFRIN   2 sprays, Nasal, 2 Times Daily      raloxifene 60 MG tablet  Commonly known as: EVISTA   60 mg, Oral, Daily      Triamcinolone Acetonide 55  MCG/ACT nasal inhaler  Commonly known as: NASACORT   2 sprays, Nasal, Daily      verapamil  MG 24 hr capsule  Commonly known as: VERELAN   240 mg, Oral, Nightly         Stop These Medications    cetirizine 10 MG tablet  Commonly known as: zyrTEC     omeprazole 40 MG capsule  Commonly known as: priLOSEC     Osteo Bi-Flex Adv Triple St tablet     valsartan-hydrochlorothiazide 80-12.5 MG per tablet  Commonly known as: DIOVAN-HCT            No Known Allergies    Discharge Disposition:        Discharge Diet:  Diet Order   Procedures   • Diet Regular; Thin; GI Soft       Discharge Activity:       CODE STATUS:    Code Status and Medical Interventions:   Ordered at: 01/09/22 1604     Code Status (Patient has no pulse and is not breathing):    CPR (Attempt to Resuscitate)     Medical Interventions (Patient has pulse or is breathing):    Full Support       Future Appointments   Date Time Provider Department Center   3/1/2022  2:00 PM [facility]   3/1/2022  2:30 PM Jim Hampton Jr., MD [facility]     Additional Instructions for the Follow-ups that You Need to Schedule     US Pelvis Complete   Jan 28, 2022      Exam reason: adnexal cyst    Release to patient: Immediate            Contact information for follow-up providers     Jim Hampton Jr., MD Follow up in 2 week(s).    Specialty: Family Medicine  Contact information:  [address] [address]  [phone]                   Contact information for after-discharge care     Destination     [facility] .    Service: Skilled Nursing  Contact information:  [facility] [address]  [phone]                             Time Spent on Discharge:  Greater than 30 minutes      Pablito Perez MD  [facility]  01/14/22  12:07 EST             PAST MEDICAL HISTORY:  Benign heart murmur     DVT (deep venous thrombosis) during left knee replacement 2019    H/O degenerative disc disease     Hyperlipidemia     Hypertension     Hypothyroid     Lab test positive for detection of COVID-19 virus 12/20    Obesity, Class II, BMI 35-39.9, no comorbidity     Osteoarthritis     Overactive bladder     Rheumatoid arthritis     SLE (systemic lupus erythematosus)

## 2022-11-28 ENCOUNTER — OFFICE VISIT (OUTPATIENT)
Dept: INTERNAL MEDICINE | Facility: CLINIC | Age: 77
End: 2022-11-28

## 2022-11-28 VITALS
HEIGHT: 66 IN | DIASTOLIC BLOOD PRESSURE: 94 MMHG | WEIGHT: 171 LBS | SYSTOLIC BLOOD PRESSURE: 156 MMHG | TEMPERATURE: 98 F | HEART RATE: 77 BPM | OXYGEN SATURATION: 98 % | BODY MASS INDEX: 27.48 KG/M2

## 2022-11-28 DIAGNOSIS — I10 PRIMARY HYPERTENSION: Primary | ICD-10-CM

## 2022-11-28 DIAGNOSIS — Z86.19 HISTORY OF CLOSTRIDIOIDES DIFFICILE COLITIS: ICD-10-CM

## 2022-11-28 DIAGNOSIS — E78.2 MIXED HYPERLIPIDEMIA: ICD-10-CM

## 2022-11-28 DIAGNOSIS — J30.1 ALLERGIC RHINITIS DUE TO POLLEN, UNSPECIFIED SEASONALITY: ICD-10-CM

## 2022-11-28 PROCEDURE — 99214 OFFICE O/P EST MOD 30 MIN: CPT | Performed by: FAMILY MEDICINE

## 2022-11-29 LAB
ALBUMIN SERPL-MCNC: 4.5 G/DL (ref 3.5–5.2)
ALBUMIN/GLOB SERPL: 2 G/DL
ALP SERPL-CCNC: 54 U/L (ref 39–117)
ALT SERPL-CCNC: 20 U/L (ref 1–33)
APPEARANCE UR: CLEAR
AST SERPL-CCNC: 27 U/L (ref 1–32)
BACTERIA #/AREA URNS HPF: ABNORMAL /HPF
BASOPHILS # BLD AUTO: 0.07 10*3/MM3 (ref 0–0.2)
BASOPHILS NFR BLD AUTO: 0.9 % (ref 0–1.5)
BILIRUB SERPL-MCNC: 0.9 MG/DL (ref 0–1.2)
BILIRUB UR QL STRIP: NEGATIVE
BUN SERPL-MCNC: 10 MG/DL (ref 8–23)
BUN/CREAT SERPL: 9.9 (ref 7–25)
CALCIUM SERPL-MCNC: 10.8 MG/DL (ref 8.6–10.5)
CASTS URNS MICRO: ABNORMAL
CHLORIDE SERPL-SCNC: 105 MMOL/L (ref 98–107)
CHOLEST SERPL-MCNC: 189 MG/DL (ref 0–200)
CHOLEST/HDLC SERPL: 2.49 {RATIO}
CO2 SERPL-SCNC: 26.7 MMOL/L (ref 22–29)
COLOR UR: YELLOW
CREAT SERPL-MCNC: 1.01 MG/DL (ref 0.57–1)
EGFRCR SERPLBLD CKD-EPI 2021: 56.7 ML/MIN/1.73
EOSINOPHIL # BLD AUTO: 0.36 10*3/MM3 (ref 0–0.4)
EOSINOPHIL NFR BLD AUTO: 4.4 % (ref 0.3–6.2)
EPI CELLS #/AREA URNS HPF: ABNORMAL /HPF
ERYTHROCYTE [DISTWIDTH] IN BLOOD BY AUTOMATED COUNT: 12.3 % (ref 12.3–15.4)
GLOBULIN SER CALC-MCNC: 2.2 GM/DL
GLUCOSE SERPL-MCNC: 88 MG/DL (ref 65–99)
GLUCOSE UR QL STRIP: NEGATIVE
HCT VFR BLD AUTO: 42 % (ref 34–46.6)
HDLC SERPL-MCNC: 76 MG/DL (ref 40–60)
HGB BLD-MCNC: 14 G/DL (ref 12–15.9)
HGB UR QL STRIP: NEGATIVE
IMM GRANULOCYTES # BLD AUTO: 0.02 10*3/MM3 (ref 0–0.05)
IMM GRANULOCYTES NFR BLD AUTO: 0.2 % (ref 0–0.5)
KETONES UR QL STRIP: NEGATIVE
LDLC SERPL CALC-MCNC: 85 MG/DL (ref 0–100)
LEUKOCYTE ESTERASE UR QL STRIP: ABNORMAL
LYMPHOCYTES # BLD AUTO: 2.6 10*3/MM3 (ref 0.7–3.1)
LYMPHOCYTES NFR BLD AUTO: 31.6 % (ref 19.6–45.3)
MCH RBC QN AUTO: 30.4 PG (ref 26.6–33)
MCHC RBC AUTO-ENTMCNC: 33.3 G/DL (ref 31.5–35.7)
MCV RBC AUTO: 91.1 FL (ref 79–97)
MONOCYTES # BLD AUTO: 0.77 10*3/MM3 (ref 0.1–0.9)
MONOCYTES NFR BLD AUTO: 9.4 % (ref 5–12)
NEUTROPHILS # BLD AUTO: 4.41 10*3/MM3 (ref 1.7–7)
NEUTROPHILS NFR BLD AUTO: 53.5 % (ref 42.7–76)
NITRITE UR QL STRIP: NEGATIVE
NRBC BLD AUTO-RTO: 0 /100 WBC (ref 0–0.2)
PH UR STRIP: 7 [PH] (ref 5–8)
PLATELET # BLD AUTO: 188 10*3/MM3 (ref 140–450)
POTASSIUM SERPL-SCNC: 3.5 MMOL/L (ref 3.5–5.2)
PROT SERPL-MCNC: 6.7 G/DL (ref 6–8.5)
PROT UR QL STRIP: NEGATIVE
RBC # BLD AUTO: 4.61 10*6/MM3 (ref 3.77–5.28)
RBC #/AREA URNS HPF: ABNORMAL /HPF
SODIUM SERPL-SCNC: 144 MMOL/L (ref 136–145)
SP GR UR STRIP: 1.02 (ref 1–1.03)
TRIGL SERPL-MCNC: 167 MG/DL (ref 0–150)
TSH SERPL DL<=0.005 MIU/L-ACNC: 2.24 UIU/ML (ref 0.27–4.2)
UROBILINOGEN UR STRIP-MCNC: ABNORMAL MG/DL
VIT B12 SERPL-MCNC: 872 PG/ML (ref 211–946)
VLDLC SERPL CALC-MCNC: 28 MG/DL (ref 5–40)
WBC # BLD AUTO: 8.23 10*3/MM3 (ref 3.4–10.8)
WBC #/AREA URNS HPF: ABNORMAL /HPF

## 2023-02-06 RX ORDER — VALSARTAN AND HYDROCHLOROTHIAZIDE 80; 12.5 MG/1; MG/1
TABLET, FILM COATED ORAL
Qty: 90 TABLET | Refills: 0 | Status: SHIPPED | OUTPATIENT
Start: 2023-02-06

## 2023-05-04 RX ORDER — RALOXIFENE HYDROCHLORIDE 60 MG/1
TABLET, FILM COATED ORAL
Qty: 90 TABLET | Refills: 1 | Status: SHIPPED | OUTPATIENT
Start: 2023-05-04

## 2023-05-04 RX ORDER — VALSARTAN AND HYDROCHLOROTHIAZIDE 80; 12.5 MG/1; MG/1
TABLET, FILM COATED ORAL
Qty: 90 TABLET | Refills: 1 | Status: SHIPPED | OUTPATIENT
Start: 2023-05-04

## 2023-05-04 RX ORDER — MONTELUKAST SODIUM 10 MG/1
TABLET ORAL
Qty: 90 TABLET | Refills: 1 | Status: SHIPPED | OUTPATIENT
Start: 2023-05-04

## 2023-06-05 RX ORDER — ATORVASTATIN CALCIUM 20 MG/1
TABLET, FILM COATED ORAL
Qty: 45 TABLET | Refills: 2 | Status: SHIPPED | OUTPATIENT
Start: 2023-06-05

## 2023-08-01 DIAGNOSIS — I10 PRIMARY HYPERTENSION: ICD-10-CM

## 2023-08-04 RX ORDER — VERAPAMIL HYDROCHLORIDE 240 MG/1
CAPSULE, EXTENDED RELEASE ORAL
Qty: 90 CAPSULE | Refills: 3 | Status: SHIPPED | OUTPATIENT
Start: 2023-08-04

## 2023-08-08 ENCOUNTER — OFFICE VISIT (OUTPATIENT)
Dept: INTERNAL MEDICINE | Facility: CLINIC | Age: 78
End: 2023-08-08
Payer: MEDICARE

## 2023-08-08 VITALS
HEART RATE: 85 BPM | OXYGEN SATURATION: 97 % | BODY MASS INDEX: 27.28 KG/M2 | DIASTOLIC BLOOD PRESSURE: 72 MMHG | SYSTOLIC BLOOD PRESSURE: 138 MMHG | WEIGHT: 169 LBS

## 2023-08-08 DIAGNOSIS — I10 PRIMARY HYPERTENSION: ICD-10-CM

## 2023-08-08 DIAGNOSIS — A49.8 CLOSTRIDIUM DIFFICILE INFECTION: Primary | ICD-10-CM

## 2023-08-08 DIAGNOSIS — R29.898 DECREASED GRIP STRENGTH: ICD-10-CM

## 2023-08-08 DIAGNOSIS — E55.9 VITAMIN D DEFICIENCY: ICD-10-CM

## 2023-08-08 DIAGNOSIS — E78.2 MIXED HYPERLIPIDEMIA: ICD-10-CM

## 2023-08-08 DIAGNOSIS — Z00.00 MEDICARE ANNUAL WELLNESS VISIT, SUBSEQUENT: ICD-10-CM

## 2023-08-08 RX ORDER — VERAPAMIL HYDROCHLORIDE 240 MG/1
240 CAPSULE, EXTENDED RELEASE ORAL NIGHTLY
Qty: 90 CAPSULE | Refills: 3 | Status: SHIPPED | OUTPATIENT
Start: 2023-08-08

## 2023-08-08 RX ORDER — FLUTICASONE PROPIONATE 50 MCG
2 SPRAY, SUSPENSION (ML) NASAL DAILY
COMMUNITY
Start: 2023-06-12

## 2023-08-08 NOTE — PROGRESS NOTES
"Chief Complaint  Medicare Wellness-subsequent    Subjective        Gardenia Roper presents to [facility]  History of Present Illness  Delightful lady with prior history of C. difficile colitis 2 years ago with recovery but feels like she has been the same since.    Treatment of hypertension with verapamil extended release 240 mg daily plus valsartan hydrochlorothiazide 80-12 0.5 daily.  This is continued as well as atorvastatin 20 mg daily for hyperlipidemia.    She has had reduced  strength both hands of uncertain etiology and wants to blame everything on age.    Objective   Vital Signs:  /72 (BP Location: Left arm, Patient Position: Sitting, Cuff Size: Adult)   Pulse 85   Wt 76.7 kg (169 lb)   SpO2 97%   BMI 27.28 kg/mý   Estimated body mass index is 27.28 kg/mý as calculated from the following:    Height as of 11/28/22: 167.6 cm (66\").    Weight as of this encounter: 76.7 kg (169 lb).             Physical Exam  Vitals reviewed.   Constitutional:       Appearance: She is well-developed.   HENT:      Head: Normocephalic and atraumatic.      Right Ear: Tympanic membrane and external ear normal.      Left Ear: Tympanic membrane and external ear normal.      Nose: Nose normal.   Eyes:      Conjunctiva/sclera: Conjunctivae normal.      Pupils: Pupils are equal, round, and reactive to light.   Neck:      Thyroid: No thyromegaly.      Vascular: No JVD.   Cardiovascular:      Rate and Rhythm: Normal rate and regular rhythm.      Heart sounds: Normal heart sounds.   Pulmonary:      Effort: Pulmonary effort is normal.      Breath sounds: Normal breath sounds.   Abdominal:      General: Bowel sounds are normal.      Palpations: Abdomen is soft.   Musculoskeletal:         General: Normal range of motion.      Cervical back: Normal range of motion and neck supple.   Lymphadenopathy:      Cervical: No cervical adenopathy.   Skin:     General: Skin is warm and dry.      Findings: No " rash.   Neurological:      Mental Status: She is alert and oriented to person, place, and time.      Cranial Nerves: No cranial nerve deficit.      Motor: Weakness present.      Coordination: Coordination normal.      Gait: Gait is intact.      Comments: Bilateral weakness of  strength of the hand without specific neurodeficit.   Psychiatric:         Behavior: Behavior normal.         Thought Content: Thought content normal.         Judgment: Judgment normal.      Result Review :                   Assessment and Plan   Diagnoses and all orders for this visit:    1. Clostridium difficile infection (Primary)  Comments:  Resolved 2 years ago    2. Primary hypertension  Comments:  Valsartan hydrochlorothiazide 8012.5 daily verapamil extended release  240 daily  Orders:  -     verapamil ER (VERELAN) 240 MG 24 hr capsule; Take 1 capsule by mouth Every Night.  Dispense: 90 capsule; Refill: 3    3. Mixed hyperlipidemia  Comments:  Atorvastatin 20 mg daily    4. Vitamin D deficiency    5. Medicare annual wellness visit, subsequent    6. Decreased  strength  Comments:  Seems to reflect decreased overall strength.  Does not look like carpal tunnel             Follow Up   Return in about 6 months (around 2/8/2024) for Recheck.  Patient was given instructions and counseling regarding her condition or for health maintenance advice. Please see specific information pulled into the AVS if appropriate.

## 2023-08-08 NOTE — PATIENT INSTRUCTIONS
Medicare Wellness  Personal Prevention Plan of Service     Date of Office Visit:    Encounter Provider:  Jim Hampton MD  Place of Service:  [facility]  Patient Name: Gardenia Roper  :  1945    As part of the Medicare Wellness portion of your visit today, we are providing you with this personalized preventive plan of services (PPPS). This plan is based upon recommendations of the [place] Preventive Services Task Force (USPSTF) and the Advisory Committee on Immunization Practices (ACIP).    This lists the preventive care services that should be considered, and provides dates of when you are due. Items listed as completed are up-to-date and do not require any further intervention.    Health Maintenance   Topic Date Due    TDAP/TD VACCINES (1 - Tdap) Never done    ZOSTER VACCINE (1 of 2) Never done    HEPATITIS C SCREENING  Never done    Pneumococcal Vaccine 65+ (2 - PPSV23 or PCV20) 2016    COVID-19 Vaccine (5 - Pfizer series) 2023    ANNUAL WELLNESS VISIT  2023    COLORECTAL CANCER SCREENING  2024 (Originally 1945)    INFLUENZA VACCINE  10/01/2023    LIPID PANEL  2023    DXA SCAN  2024       No orders of the defined types were placed in this encounter.      Return in about 6 months (around 2024) for Recheck.

## 2023-08-08 NOTE — PROGRESS NOTES
The ABCs of the Annual Wellness Visit  Subsequent Medicare Wellness Visit    Subjective      Gardenia Roper is a 77 y.o. female who presents for a Subsequent Medicare Wellness Visit.    The following portions of the patient's history were reviewed and   updated as appropriate: allergies, current medications, past family history, past medical history, past social history, past surgical history, and problem list.    Compared to one year ago, the patient feels her physical   health is the same.    Compared to one year ago, the patient feels her mental   health is the same.    Recent Hospitalizations:  She was not admitted to the hospital during the last year.       Current Medical Providers:  Patient Care Team:  Jim Hampton MD as PCP - General (Family Medicine)  Edil Little MD (Inactive) as Consulting Physician (Otolaryngology)  Asif Cazares MD as Consulting Physician (Allergy and Immunology)  Sonny Mejias MD as Consulting Physician (Ophthalmology)    Outpatient Medications Prior to Visit   Medication Sig Dispense Refill    atorvastatin (LIPITOR) 20 MG tablet TAKE 1/2 TABLET BY MOUTH DAILY 45 tablet 2    cholecalciferol (VITAMIN D3) 25 MCG (1000 UT) tablet Take 1 tablet by mouth Daily.      fluticasone (FLONASE) 50 MCG/ACT nasal spray 2 sprays by Each Nare route Daily. Shake liquid      montelukast (SINGULAIR) 10 MG tablet TAKE 1 TABLET BY MOUTH EVERY NIGHT 90 tablet 1    Multiple Vitamins-Minerals (CENTRUM ADULTS PO) Take 1 tablet by mouth Daily.      potassium chloride (K-DUR,KLOR-CON) 20 MEQ CR tablet TAKE 1 TABLET BY MOUTH TWICE DAILY FOR 2 DAYS 4 tablet 0    raloxifene (EVISTA) 60 MG tablet TAKE 1 TABLET BY MOUTH DAILY 90 tablet 1    valsartan-hydrochlorothiazide (DIOVAN-HCT) 80-12.5 MG per tablet TAKE 1 TABLET BY MOUTH DAILY 90 tablet 1    verapamil ER (VERELAN) 240 MG 24 hr capsule TAKE 1 CAPSULE BY MOUTH EVERY NIGHT 90 capsule 3     No facility-administered medications prior to visit.  "      No opioid medication identified on active medication list. I have reviewed chart for other potential  high risk medication/s and harmful drug interactions in the elderly.        Aspirin is not on active medication list.  Aspirin use is not indicated based on review of current medical condition/s. Risk of harm outweighs potential benefits.  .    Patient Active Problem List   Diagnosis    Right ureteral stone    Hypertension    Hyperlipidemia    Hypercalcemia    Gastroesophageal reflux disease    Atopic rhinitis    Medicare annual wellness visit, subsequent    Vitamin D deficiency    Diarrhea of presumed infectious origin    Leukocytosis    Acidosis    Clostridium difficile infection    History of Clostridioides difficile colitis    Simple adnexal cyst greater than 1 cm in diameter in postmenopausal patient     Advance Care Planning   Advance Care Planning     Advance Directive is on file.  ACP discussion was held with the patient during this visit. Patient has an advance directive in EMR which is still valid.      Objective    Vitals:    23 1549   BP: 138/72   BP Location: Left arm   Patient Position: Sitting   Cuff Size: Adult   Pulse: 85   SpO2: 97%   Weight: 76.7 kg (169 lb)   PainSc: 0-No pain     Estimated body mass index is 27.28 kg/mý as calculated from the following:    Height as of 22: 167.6 cm (66\").    Weight as of this encounter: 76.7 kg (169 lb).    BMI is >= 25 and <30. (Overweight) The following options were offered after discussion;: exercise counseling/recommendations      Does the patient have evidence of cognitive impairment?   No            HEALTH RISK ASSESSMENT    Smoking Status:  Social History     Tobacco Use   Smoking Status Former    Packs/day: 1.00    Types: Cigarettes    Quit date: 1986    Years since quittin.2   Smokeless Tobacco Never   Tobacco Comments    quit 25 years ago     Alcohol Consumption:  Social History     Substance and Sexual Activity   Alcohol Use " Yes    Alcohol/week: 1.0 standard drink    Types: 1 Glasses of wine per week    Comment: 2-3 times a week, one glass of wine, none in last 3-4 days     Fall Risk Screen:    ELSY Fall Risk Assessment was completed, and patient is at LOW risk for falls.Assessment completed on:2023    Depression Screenin/8/2023     3:53 PM   PHQ-2/PHQ-9 Depression Screening   Little Interest or Pleasure in Doing Things 0-->not at all   Feeling Down, Depressed or Hopeless 0-->not at all   PHQ-9: Brief Depression Severity Measure Score 0       Health Habits and Functional and Cognitive Screenin/8/2023     3:52 PM   Functional & Cognitive Status   Do you have difficulty preparing food and eating? No   Do you have difficulty bathing yourself, getting dressed or grooming yourself? No   Do you have difficulty using the toilet? No   Do you have difficulty moving around from place to place? No   Do you have trouble with steps or getting out of a bed or a chair? No   Current Diet Well Balanced Diet   Dental Exam Not up to date   Eye Exam Up to date   Exercise (times per week) 0 times per week   Current Exercises Include No Regular Exercise   Do you need help using the phone?  No   Are you deaf or do you have serious difficulty hearing?  No   Do you need help to go to places out of walking distance? No   Do you need help shopping? No   Do you need help preparing meals?  No   Do you need help with housework?  No   Do you need help with laundry? No   Do you need help taking your medications? No   Do you need help managing money? No   Do you ever drive or ride in a car without wearing a seat belt? No   Have you felt unusual stress, anger or loneliness in the last month? No   Who do you live with? Alone   If you need help, do you have trouble finding someone available to you? No   Have you been bothered in the last four weeks by sexual problems? No   Do you have difficulty concentrating, remembering or making decisions? No        Age-appropriate Screening Schedule:  Refer to the list below for future screening recommendations based on patient's age, sex and/or medical conditions. Orders for these recommended tests are listed in the plan section. The patient has been provided with a written plan.    Health Maintenance   Topic Date Due    TDAP/TD VACCINES (1 - Tdap) Never done    ZOSTER VACCINE (1 of 2) Never done    HEPATITIS C SCREENING  Never done    Pneumococcal Vaccine 65+ (2 - PPSV23 or PCV20) 11/06/2016    COVID-19 Vaccine (5 - Pfizer series) 02/05/2023    ANNUAL WELLNESS VISIT  03/01/2023    COLORECTAL CANCER SCREENING  08/29/2024 (Originally 1945)    INFLUENZA VACCINE  10/01/2023    LIPID PANEL  11/28/2023    DXA SCAN  03/01/2024                  CMS Preventative Services Quick Reference  Risk Factors Identified During Encounter:    None Identified    The above risks/problems have been discussed with the patient.  Pertinent information has been shared with the patient in the After Visit Summary.    Diagnoses and all orders for this visit:    1. Clostridium difficile infection (Primary)    2. Primary hypertension  -     verapamil ER (VERELAN) 240 MG 24 hr capsule; Take 1 capsule by mouth Every Night.  Dispense: 90 capsule; Refill: 3    3. Mixed hyperlipidemia    4. Vitamin D deficiency    5. Medicare annual wellness visit, subsequent        Follow Up:   Next Medicare Wellness visit to be scheduled in 1 year.      An After Visit Summary and PPPS were made available to the patient.

## 2023-10-30 DIAGNOSIS — M81.0 AGE-RELATED OSTEOPOROSIS WITHOUT CURRENT PATHOLOGICAL FRACTURE: ICD-10-CM

## 2023-10-30 DIAGNOSIS — I10 PRIMARY HYPERTENSION: ICD-10-CM

## 2023-10-30 DIAGNOSIS — J30.1 ALLERGIC RHINITIS DUE TO POLLEN, UNSPECIFIED SEASONALITY: Primary | ICD-10-CM

## 2023-10-30 RX ORDER — RALOXIFENE HYDROCHLORIDE 60 MG/1
TABLET, FILM COATED ORAL
Qty: 90 TABLET | Refills: 1 | Status: SHIPPED | OUTPATIENT
Start: 2023-10-30

## 2023-10-30 RX ORDER — MONTELUKAST SODIUM 10 MG/1
TABLET ORAL
Qty: 90 TABLET | Refills: 1 | Status: SHIPPED | OUTPATIENT
Start: 2023-10-30

## 2023-10-30 RX ORDER — VALSARTAN AND HYDROCHLOROTHIAZIDE 80; 12.5 MG/1; MG/1
TABLET, FILM COATED ORAL
Qty: 90 TABLET | Refills: 1 | Status: SHIPPED | OUTPATIENT
Start: 2023-10-30

## 2023-12-29 RX ORDER — ATORVASTATIN CALCIUM 20 MG/1
TABLET, FILM COATED ORAL
Qty: 45 TABLET | Refills: 2 | Status: SHIPPED | OUTPATIENT
Start: 2023-12-29